# Patient Record
Sex: MALE | Race: WHITE | Employment: UNEMPLOYED | URBAN - METROPOLITAN AREA
[De-identification: names, ages, dates, MRNs, and addresses within clinical notes are randomized per-mention and may not be internally consistent; named-entity substitution may affect disease eponyms.]

---

## 2019-02-25 ENCOUNTER — APPOINTMENT (OUTPATIENT)
Dept: CT IMAGING | Age: 59
End: 2019-02-25
Attending: PHYSICIAN ASSISTANT
Payer: MEDICAID

## 2019-02-25 ENCOUNTER — HOSPITAL ENCOUNTER (EMERGENCY)
Age: 59
Discharge: HOME | End: 2019-02-25
Attending: EMERGENCY MEDICINE
Payer: MEDICAID

## 2019-02-25 ENCOUNTER — OFFICE VISIT (OUTPATIENT)
Dept: INTERNAL MEDICINE CLINIC | Age: 59
End: 2019-02-25

## 2019-02-25 VITALS
OXYGEN SATURATION: 99 % | SYSTOLIC BLOOD PRESSURE: 165 MMHG | BODY MASS INDEX: 30.35 KG/M2 | WEIGHT: 229 LBS | HEIGHT: 73 IN | RESPIRATION RATE: 18 BRPM | DIASTOLIC BLOOD PRESSURE: 97 MMHG | HEART RATE: 68 BPM | TEMPERATURE: 99.6 F

## 2019-02-25 VITALS
SYSTOLIC BLOOD PRESSURE: 224 MMHG | BODY MASS INDEX: 30.35 KG/M2 | RESPIRATION RATE: 16 BRPM | TEMPERATURE: 98 F | HEART RATE: 71 BPM | WEIGHT: 229 LBS | HEIGHT: 73 IN | OXYGEN SATURATION: 97 % | DIASTOLIC BLOOD PRESSURE: 116 MMHG

## 2019-02-25 DIAGNOSIS — I16.0 HYPERTENSIVE URGENCY: Primary | ICD-10-CM

## 2019-02-25 DIAGNOSIS — R03.0 ELEVATED BLOOD PRESSURE READING: Primary | ICD-10-CM

## 2019-02-25 LAB
ALBUMIN SERPL-MCNC: 3.9 G/DL (ref 3.4–5)
ALBUMIN/GLOB SERPL: 1 {RATIO} (ref 0.8–1.7)
ALP SERPL-CCNC: 66 U/L (ref 45–117)
ALT SERPL-CCNC: 33 U/L (ref 16–61)
ANION GAP SERPL CALC-SCNC: 8 MMOL/L (ref 3–18)
AST SERPL-CCNC: 19 U/L (ref 15–37)
BASOPHILS # BLD: 0 K/UL (ref 0–0.06)
BASOPHILS NFR BLD: 0 % (ref 0–3)
BILIRUB SERPL-MCNC: 0.2 MG/DL (ref 0.2–1)
BUN SERPL-MCNC: 15 MG/DL (ref 7–18)
BUN/CREAT SERPL: 13 (ref 12–20)
CALCIUM SERPL-MCNC: 8.9 MG/DL (ref 8.5–10.1)
CHLORIDE SERPL-SCNC: 105 MMOL/L (ref 100–108)
CK MB CFR SERPL CALC: NORMAL % (ref 0–4)
CK MB SERPL-MCNC: <1 NG/ML (ref 5–25)
CK SERPL-CCNC: 52 U/L (ref 39–308)
CO2 SERPL-SCNC: 27 MMOL/L (ref 21–32)
CREAT SERPL-MCNC: 1.14 MG/DL (ref 0.6–1.3)
DIFFERENTIAL METHOD BLD: NORMAL
EOSINOPHIL # BLD: 0 K/UL (ref 0–0.4)
EOSINOPHIL NFR BLD: 0 % (ref 0–5)
ERYTHROCYTE [DISTWIDTH] IN BLOOD BY AUTOMATED COUNT: 14 % (ref 11.6–14.5)
GLOBULIN SER CALC-MCNC: 4 G/DL (ref 2–4)
GLUCOSE SERPL-MCNC: 92 MG/DL (ref 74–99)
HCT VFR BLD AUTO: 44.9 % (ref 36–48)
HGB BLD-MCNC: 14.5 G/DL (ref 13–16)
LYMPHOCYTES # BLD: 2.2 K/UL (ref 0.8–3.5)
LYMPHOCYTES NFR BLD: 28 % (ref 20–51)
MCH RBC QN AUTO: 27.4 PG (ref 24–34)
MCHC RBC AUTO-ENTMCNC: 32.3 G/DL (ref 31–37)
MCV RBC AUTO: 84.9 FL (ref 74–97)
MONOCYTES # BLD: 0.5 K/UL (ref 0–1)
MONOCYTES NFR BLD: 7 % (ref 2–9)
NEUTS SEG # BLD: 5 K/UL (ref 1.8–8)
NEUTS SEG NFR BLD: 65 % (ref 42–75)
PLATELET # BLD AUTO: 244 K/UL (ref 135–420)
PLATELET COMMENTS,PCOM: NORMAL
PMV BLD AUTO: 10.1 FL (ref 9.2–11.8)
POTASSIUM SERPL-SCNC: 4 MMOL/L (ref 3.5–5.5)
PROT SERPL-MCNC: 7.9 G/DL (ref 6.4–8.2)
RBC # BLD AUTO: 5.29 M/UL (ref 4.7–5.5)
RBC MORPH BLD: NORMAL
SODIUM SERPL-SCNC: 140 MMOL/L (ref 136–145)
TROPONIN I SERPL-MCNC: <0.02 NG/ML (ref 0–0.04)
WBC # BLD AUTO: 7.7 K/UL (ref 4.6–13.2)

## 2019-02-25 PROCEDURE — 70450 CT HEAD/BRAIN W/O DYE: CPT

## 2019-02-25 PROCEDURE — 82550 ASSAY OF CK (CPK): CPT

## 2019-02-25 PROCEDURE — 85025 COMPLETE CBC W/AUTO DIFF WBC: CPT

## 2019-02-25 PROCEDURE — 99283 EMERGENCY DEPT VISIT LOW MDM: CPT

## 2019-02-25 PROCEDURE — 80053 COMPREHEN METABOLIC PANEL: CPT

## 2019-02-25 PROCEDURE — 93005 ELECTROCARDIOGRAM TRACING: CPT

## 2019-02-25 NOTE — ED TRIAGE NOTES
Patient c/o intermittent frontal headaches and dizziness x 3 weeks. Voices concerns for elevated blood pressure following PCP visit.

## 2019-02-25 NOTE — ED NOTES
Pt resting comfortably on stretcher, in nad, denies pain at this time, updated on plan of care waiting for CT scan, oriented to call bell, visitor at bedside.

## 2019-02-25 NOTE — PROGRESS NOTES
ROOM # 11 Rufina Yen. presents today for Chief Complaint Patient presents with [facility] Care  Fatigue Rufina Yen. preferred language for health care discussion is english/other. Depression Screening: 
3 most recent PHQ Screens 2/25/2019 Little interest or pleasure in doing things Not at all Feeling down, depressed, irritable, or hopeless Not at all Total Score PHQ 2 0 Learning Assessment: 
No flowsheet data found. Abuse Screening: No flowsheet data found. Fall Risk No flowsheet data found. Visit Vitals BP (!) 224/126 (BP 1 Location: Left arm, BP Patient Position: Sitting) Pulse 71 Temp 98 °F (36.7 °C) (Oral) Resp 16 Ht 6' 1\" (1.854 m) Wt 229 lb (103.9 kg) SpO2 97% BMI 30.21 kg/m² Health Maintenance reviewed and discussed per provider. Yes Rufina Yen. is due for Health Maintenance Due Topic Date Due  
 Hepatitis C Screening  1960  
 DTaP/Tdap/Td series (1 - Tdap) 01/27/1981  Shingrix Vaccine Age 50> (1 of 2) 01/27/2010  
 FOBT Q 1 YEAR AGE 50-75  01/27/2010  Influenza Age 5 to Adult  08/01/2018 Please order/place referral if appropriate. Advance Directive: 1. Do you have an advance directive in place? Patient Reply: No 
 
2. If not, would you like material regarding how to put one in place? Patient Reply: No 
 
Coordination of Care: 1. Have you been to the ER, urgent care clinic since your last visit? Hospitalized since your last visit?  No

## 2019-02-25 NOTE — DISCHARGE INSTRUCTIONS
Patient Education        Elevated Blood Pressure: Care Instructions  Your Care Instructions    Blood pressure is a measure of how hard the blood pushes against the walls of your arteries. It's normal for blood pressure to go up and down throughout the day. But if it stays up over time, you have high blood pressure. Two numbers tell you your blood pressure. The first number is the systolic pressure. It shows how hard the blood pushes when your heart is pumping. The second number is the diastolic pressure. It shows how hard the blood pushes between heartbeats, when your heart is relaxed and filling with blood. An ideal blood pressure in adults is less than 120/80 (say \"120 over 80\"). High blood pressure is 140/90 or higher. You have high blood pressure if your top number is 140 or higher or your bottom number is 90 or higher, or both. The main test for high blood pressure is simple, fast, and painless. To diagnose high blood pressure, your doctor will test your blood pressure at different times. After testing your blood pressure, your doctor may ask you to test it again when you are home. If you are diagnosed with high blood pressure, you can work with your doctor to make a long-term plan to manage it. Follow-up care is a key part of your treatment and safety. Be sure to make and go to all appointments, and call your doctor if you are having problems. It's also a good idea to know your test results and keep a list of the medicines you take. How can you care for yourself at home? · Do not smoke. Smoking increases your risk for heart attack and stroke. If you need help quitting, talk to your doctor about stop-smoking programs and medicines. These can increase your chances of quitting for good. · Stay at a healthy weight. · Try to limit how much sodium you eat to less than 2,300 milligrams (mg) a day. Your doctor may ask you to try to eat less than 1,500 mg a day. · Be physically active.  Get at least 30 minutes of exercise on most days of the week. Walking is a good choice. You also may want to do other activities, such as running, swimming, cycling, or playing tennis or team sports. · Avoid or limit alcohol. Talk to your doctor about whether you can drink any alcohol. · Eat plenty of fruits, vegetables, and low-fat dairy products. Eat less saturated and total fats. · Learn how to check your blood pressure at home. When should you call for help? Call your doctor now or seek immediate medical care if:  ? · Your blood pressure is much higher than normal (such as 180/110 or higher). ? · You think high blood pressure is causing symptoms such as:  ¨ Severe headache. ¨ Blurry vision. ? Watch closely for changes in your health, and be sure to contact your doctor if:  ? · You do not get better as expected. Where can you learn more? Go to http://gasper-regina.info/. Enter X058 in the search box to learn more about \"Elevated Blood Pressure: Care Instructions. \"  Current as of: September 21, 2016  Content Version: 11.4  © 5435-0274 [employer]. Care instructions adapted under license by [employer] (which disclaims liability or warranty for this information). If you have questions about a medical condition or this instruction, always ask your healthcare professional. [address] any warranty or liability for your use of this information.

## 2019-02-25 NOTE — ED PROVIDER NOTES
EMERGENCY DEPARTMENT HISTORY AND PHYSICAL EXAM 
 
Date: 2/25/2019 Patient Name: Andres Narayanan. History of Presenting Illness Chief Complaint Patient presents with  Hypertension  Dizziness History Provided By: Patient Chief Complaint: high bp Duration: unknown Timing: Location:  
Quality:  
Severity:moderate Modifying Factors:  
Associated Symptoms: dizziness, headaches Additional History (Context): Andres Flores is a 61 y.o. male with No significant past medical history  who presents with high BP noted during apt with PCP earlier today. States BP was high at new pcp today so was sent to ED for work up. States he has no sx at present, occasionally has some dizziness and a headache but not now. No CP, sob, abdominal pain, nv or any other sx. Denies smoking, etoh. +marijuana use. PCP: Leda Saha MD 
 
Current Outpatient Medications Medication Sig Dispense Refill  pseudoephedrine HCl (SUDAFED 12 HOUR PO) Take  by mouth. Past History Past Medical History: 
Past Medical History:  
Diagnosis Date  Hypertension Past Surgical History: No past surgical history on file. Family History: 
Family History Problem Relation Age of Onset  Hypertension Mother  Cancer Father Throat  Hypertension Father  Cancer Maternal Grandmother  Cancer Maternal Grandfather  Heart Disease Paternal Grandfather Social History: 
Social History Tobacco Use  Smoking status: Never Smoker  Smokeless tobacco: Current User Substance Use Topics  Alcohol use: No  
  Frequency: Never  Drug use: Yes Types: Marijuana Allergies: 
No Known Allergies Review of Systems Review of Systems Constitutional: Negative for chills and fever. HENT: Negative for nasal congestion, sore throat, rhinorrhea Eyes: Negative. Respiratory: neg cough and negative for shortness of breath. Cardiovascular: Negative for chest pain and palpitations. Gastrointestinal: Negative for abdominal pain, constipation, diarrhea, nausea and vomiting. Genitourinary: Negative. Negative for difficulty urinating and flank pain. Musculoskeletal: Negative for back pain. Negative for gait problem and neck pain. Skin: Negative. Allergic/Immunologic: Negative. Neurological: pos for headaches, pos for dizziness, weakness, numbness. Psychiatric/Behavioral: Negative. All other systems reviewed and are negative. All Other Systems Negative Physical Exam  
 
Vitals:  
 02/25/19 1459 02/25/19 1515 02/25/19 1600 02/25/19 1645 BP: (!) 197/114 (!) 170/105 (!) 160/91 (!) 157/93 Pulse: 68 Resp: 18 Temp: 99.6 °F (37.6 °C) SpO2: 97% 97% 97% 98% Weight: 103.9 kg (229 lb) Height: 6' 1\" (1.854 m) Physical Exam  
Constitutional: He is oriented to person, place, and time. He appears well-developed and well-nourished. No distress. NAD, well hydrated, non toxic HENT:  
Head: Normocephalic and atraumatic. Nose: Nose normal.  
Mouth/Throat: Oropharynx is clear and moist. No oropharyngeal exudate. Eyes: Conjunctivae and EOM are normal. Pupils are equal, round, and reactive to light. Neck: Normal range of motion. Neck supple. Cardiovascular: Normal rate, regular rhythm and normal heart sounds. No murmur heard. Pulmonary/Chest: Effort normal and breath sounds normal. No respiratory distress. He has no wheezes. He has no rales. Abdominal: Soft. He exhibits no distension. There is no tenderness. There is no guarding. Musculoskeletal: Normal range of motion. Lymphadenopathy:  
  He has no cervical adenopathy. Neurological: He is alert and oriented to person, place, and time. No cranial nerve deficit. Coordination normal.  
CN2-12 intact. no nystagmus, neg pronator drift, neg romberg, neg LE drift. Normal gait. Skin: Skin is warm. No rash noted. He is not diaphoretic. Psychiatric: He has a normal mood and affect. His behavior is normal.  
Nursing note and vitals reviewed. Diagnostic Study Results Labs - Recent Results (from the past 12 hour(s)) EKG, 12 LEAD, INITIAL Collection Time: 02/25/19  3:05 PM  
Result Value Ref Range Ventricular Rate 66 BPM  
 Atrial Rate 66 BPM  
 P-R Interval 162 ms QRS Duration 94 ms Q-T Interval 378 ms QTC Calculation (Bezet) 396 ms Calculated P Axis 52 degrees Calculated R Axis 24 degrees Calculated T Axis 66 degrees Diagnosis Normal sinus rhythm Normal ECG No previous ECGs available CBC WITH AUTOMATED DIFF Collection Time: 02/25/19  3:35 PM  
Result Value Ref Range WBC 7.7 4.6 - 13.2 K/uL  
 RBC 5.29 4.70 - 5.50 M/uL  
 HGB 14.5 13.0 - 16.0 g/dL HCT 44.9 36.0 - 48.0 % MCV 84.9 74.0 - 97.0 FL  
 MCH 27.4 24.0 - 34.0 PG  
 MCHC 32.3 31.0 - 37.0 g/dL  
 RDW 14.0 11.6 - 14.5 % PLATELET 447 140 - 091 K/uL MPV 10.1 9.2 - 11.8 FL  
 NEUTROPHILS 65 42 - 75 % LYMPHOCYTES 28 20 - 51 % MONOCYTES 7 2 - 9 % EOSINOPHILS 0 0 - 5 % BASOPHILS 0 0 - 3 %  
 ABS. NEUTROPHILS 5.0 1.8 - 8.0 K/UL  
 ABS. LYMPHOCYTES 2.2 0.8 - 3.5 K/UL  
 ABS. MONOCYTES 0.5 0 - 1.0 K/UL  
 ABS. EOSINOPHILS 0.0 0.0 - 0.4 K/UL  
 ABS. BASOPHILS 0.0 0.0 - 0.06 K/UL  
 DF MANUAL PLATELET COMMENTS ADEQUATE PLATELETS    
 RBC COMMENTS NORMOCYTIC, NORMOCHROMIC METABOLIC PANEL, COMPREHENSIVE Collection Time: 02/25/19  3:35 PM  
Result Value Ref Range Sodium 140 136 - 145 mmol/L Potassium 4.0 3.5 - 5.5 mmol/L Chloride 105 100 - 108 mmol/L  
 CO2 27 21 - 32 mmol/L Anion gap 8 3.0 - 18 mmol/L Glucose 92 74 - 99 mg/dL BUN 15 7.0 - 18 MG/DL Creatinine 1.14 0.6 - 1.3 MG/DL  
 BUN/Creatinine ratio 13 12 - 20 GFR est AA >60 >60 ml/min/1.73m2 GFR est non-AA >60 >60 ml/min/1.73m2  Calcium 8.9 8.5 - 10.1 MG/DL  
 Bilirubin, total 0.2 0.2 - 1.0 MG/DL  
 ALT (SGPT) 33 16 - 61 U/L  
 AST (SGOT) 19 15 - 37 U/L Alk. phosphatase 66 45 - 117 U/L Protein, total 7.9 6.4 - 8.2 g/dL Albumin 3.9 3.4 - 5.0 g/dL Globulin 4.0 2.0 - 4.0 g/dL A-G Ratio 1.0 0.8 - 1.7 CARDIAC PANEL,(CK, CKMB & TROPONIN) Collection Time: 02/25/19  3:35 PM  
Result Value Ref Range CK 52 39 - 308 U/L  
 CK - MB <1.0 <3.6 ng/ml CK-MB Index  0.0 - 4.0 % CALCULATION NOT PERFORMED WHEN RESULT IS BELOW LINEAR LIMIT Troponin-I, QT <0.02 0.0 - 0.045 NG/ML Radiologic Studies -  
CT HEAD WO CONT    (Results Pending) CT Results  (Last 48 hours) None CXR Results  (Last 48 hours) None Medical Decision Making I am the first provider for this patient. I reviewed the vital signs, available nursing notes, past medical history, past surgical history, family history and social history. Vital Signs-Reviewed the patient's vital signs. Records Reviewed: Nursing Notes Procedures: 
Procedures Provider Notes (Medical Decision Making):  
[id number] M sent to ED by PCP for elevated BP. No sx at present, h/o intermittent headaches and some dizziness- not at present. Labs, cxr, ekg negative. No signs of end organ disease. BP improved. Will d/c home with f/u on Friday at scheduled apt. MED RECONCILIATION: 
No current facility-administered medications for this encounter. Current Outpatient Medications Medication Sig  pseudoephedrine HCl (SUDAFED 12 HOUR PO) Take  by mouth. Disposition: 
home DISCHARGE NOTE:  
 
Pt has been reexamined. Patient has no new complaints, changes, or physical findings. Care plan outlined and precautions discussed. Discussed proper way to take medications. Discussed treatment plan, return precautions, symptomatic relief, and expected time to improvement. All questions answered.  Patient is stable for discharge and outpatient management. Patient is ready to go home. Follow-up Information None Current Discharge Medication List  
  
 
 
 
 
 
Diagnosis Clinical Impression: No diagnosis found.

## 2019-02-25 NOTE — PROGRESS NOTES
HPI  
 
Nikita Cornejo. is a 61 y.o. male with no significant PMH, here to establish care. The patient does report h/o chronic headaches, mainly in AM, associated with fatigue, mental fog sometimes. Presently having the headache, he describes as mild to moderate, frontal, non radiated, pressure like, associated with mild malaise. States the headaches have been worsening for the past few weeks but have been present for many months now. The patient's blood pressure is severely elevated 224/116, HR 71, O2sat 97%. Denies any known history of HTN, but has not been evaluated by a medical provider in almost 12 years. Says he used to be incarcerated. The patient denies any known drug allergies, does not take any prescription medications. Denies any h/o Tobacco use. Admits to drinking alcohol rarely (last drink around 3 weeks ago he says), and h/o marijuana use. The patient works in construction, has a girlfriend with him today in the waiting room, he says. He denies any chest pain, palpitations, dizziness, AMS, shortness of breath, syncope, nausea, vomiting, diarrhea, constipation, abdominal pain, GERD, rashes, dysuria, hematuria, frequency, incontinence, leg swelling. Does report chronic left hip pain, associated at times with pain shooting down his left leg, and bilateral feet numbness and tingling intermittently. ROS As above included in HPI. Otherwise 11 point review of systems negative including constitutional, skin, HENT, eyes, respiratory, cardiovascular, gastrointestinal, genitourinary, musculoskeletal, endocrine, hematologic, allergy, and neurologic. Past Medical History Past Medical History:  
Diagnosis Date  Hypertension History reviewed. No pertinent surgical history. Family History Family History Problem Relation Age of Onset  Hypertension Mother  Cancer Father Throat  Hypertension Father  Cancer Maternal Grandmother  Cancer Maternal Grandfather  Heart Disease Paternal Grandfather Social History He  reports that  has never smoked. He uses smokeless tobacco.  
Social History Substance and Sexual Activity Alcohol Use No  
 Frequency: Never Immunization History There is no immunization history on file for this patient. Allergies No Known Allergies Medications No current outpatient medications on file. No current facility-administered medications for this visit. Visit Vitals BP (!) 224/116 (BP 1 Location: Left arm, BP Patient Position: Sitting) Pulse 71 Temp 98 °F (36.7 °C) (Oral) Resp 16 Ht 6' 1\" (1.854 m) Wt 229 lb (103.9 kg) SpO2 97% BMI 30.21 kg/m² Body mass index is 30.21 kg/m². Physical Exam  
Constitutional: He is oriented to person, place, and time and well-developed, well-nourished, and in no distress. No distress. HENT:  
Head: Normocephalic and atraumatic. Right Ear: External ear normal.  
Nose: Nose normal.  
Mouth/Throat: Oropharynx is clear and moist.  
Eyes: Conjunctivae are normal. Pupils are equal, round, and reactive to light. Neck: Neck supple. No JVD present. Cardiovascular: Normal rate and regular rhythm. Pulmonary/Chest: Effort normal and breath sounds normal.  
Abdominal: Soft. Musculoskeletal: He exhibits no edema. Neurological: He is alert and oriented to person, place, and time. Skin: Skin is warm. He is not diaphoretic. Psychiatric: Affect normal.  
Nursing note and vitals reviewed. REVIEW OF DATA Labs No results found for any previous visit. CT Results (most recent): No results found for this or any previous visit. XR Results (most recent): No results found for this or any previous visit. CT All Micro Results None DIAGNOSIS AND PLAN There is no problem list on file for this patient. 1. Hypertensive urgency BP in clinic today of 224/116, associated with headache, fatigue. Strong FH of CAD. Referred to the ER for management. Close follow up within 3 days of discharge for long term management and continuity of care. Advised patient to call in case of any issues or concerns. Follow-up Disposition: Not on File Niharika Pereira MD

## 2019-02-26 LAB
ATRIAL RATE: 66 BPM
CALCULATED P AXIS, ECG09: 52 DEGREES
CALCULATED R AXIS, ECG10: 24 DEGREES
CALCULATED T AXIS, ECG11: 66 DEGREES
DIAGNOSIS, 93000: NORMAL
P-R INTERVAL, ECG05: 162 MS
Q-T INTERVAL, ECG07: 378 MS
QRS DURATION, ECG06: 94 MS
QTC CALCULATION (BEZET), ECG08: 396 MS
VENTRICULAR RATE, ECG03: 66 BPM

## 2019-03-01 ENCOUNTER — OFFICE VISIT (OUTPATIENT)
Dept: INTERNAL MEDICINE CLINIC | Age: 59
End: 2019-03-01

## 2019-03-01 VITALS
OXYGEN SATURATION: 99 % | DIASTOLIC BLOOD PRESSURE: 90 MMHG | HEIGHT: 73 IN | RESPIRATION RATE: 17 BRPM | SYSTOLIC BLOOD PRESSURE: 150 MMHG | BODY MASS INDEX: 30.35 KG/M2 | HEART RATE: 89 BPM | TEMPERATURE: 98.5 F | WEIGHT: 229 LBS

## 2019-03-01 DIAGNOSIS — I10 ESSENTIAL HYPERTENSION: Primary | ICD-10-CM

## 2019-03-01 DIAGNOSIS — M54.42 ACUTE MIDLINE LOW BACK PAIN WITH LEFT-SIDED SCIATICA: ICD-10-CM

## 2019-03-01 RX ORDER — GABAPENTIN 100 MG/1
100 CAPSULE ORAL
Qty: 30 CAP | Refills: 1 | Status: SHIPPED | OUTPATIENT
Start: 2019-03-01 | End: 2019-04-02 | Stop reason: ALTCHOICE

## 2019-03-01 RX ORDER — LOSARTAN POTASSIUM 50 MG/1
50 TABLET ORAL DAILY
Qty: 30 TAB | Refills: 6 | Status: SHIPPED | OUTPATIENT
Start: 2019-03-01 | End: 2019-04-12 | Stop reason: ALTCHOICE

## 2019-03-01 NOTE — PROGRESS NOTES
1. Have you been to the ER, urgent care clinic or hospitalized since your last visit? YES.     2. Have you seen or consulted any other health care providers outside of the [address] [place] [address] since your last visit (Include any pap smears or colon screening)? NO      Do you have an Advanced Directive? NO    Would you like information on Advanced Directives?  NO

## 2019-03-01 NOTE — PROGRESS NOTES
EVIN Rodriguez is a 61 y.o. male with HTN, sent to the ED 3 days ago for hypertensive emergency, work up reviewed, neg Tn, CT head unremarkable, CBC and CMP within normal range. Patient feels better today, but persists with occasional headaches, dizziness, and reports as well chronic low back and left hip pain, worse with activity and at night, with shooting sensation from left hip down his left leg. No motor/sensory changes noted other than radiated hip/leg pain. Denies any recent CP, SOB, leg swelling, palpitations. ROS  As above included in HPI. Otherwise 11 point review of systems negative including constitutional, skin, HENT, eyes, respiratory, cardiovascular, gastrointestinal, genitourinary, musculoskeletal, endocrine, hematologic, allergy, and neurologic. Past Medical History  Past Medical History:   Diagnosis Date    Hypertension      No past surgical history on file. Family History  Family History   Problem Relation Age of Onset    Hypertension Mother     Cancer Father         Throat    Hypertension Father     Cancer Maternal Grandmother     Cancer Maternal Grandfather     Heart Disease Paternal Grandfather        Social History  He  reports that  has never smoked. He uses smokeless tobacco.   Social History     Substance and Sexual Activity   Alcohol Use No    Frequency: Never       Immunization History    There is no immunization history on file for this patient. Allergies  No Known Allergies    Medications  Current Outpatient Medications   Medication Sig    losartan (COZAAR) 50 mg tablet Take 1 Tab by mouth daily.  gabapentin (NEURONTIN) 100 mg capsule Take 1 Cap by mouth nightly as needed for Pain. No current facility-administered medications for this visit.           Visit Vitals  /90 (BP 1 Location: Right arm, BP Patient Position: Sitting)   Pulse 89   Temp 98.5 °F (36.9 °C) (Oral)   Resp 17   Ht 6' 1\" (1.854 m)   Wt 229 lb (103.9 kg)   SpO2 99%   BMI 30.21 kg/m²     Body mass index is 30.21 kg/m². Physical Exam   Constitutional: He is well-developed, well-nourished, and in no distress. HENT:   Head: Normocephalic and atraumatic. Eyes: Pupils are equal, round, and reactive to light. Neck: Neck supple. Cardiovascular: Normal rate and regular rhythm. Pulmonary/Chest: Effort normal and breath sounds normal.   Abdominal: Soft. Musculoskeletal: He exhibits no edema. Nursing note and vitals reviewed. REVIEW OF DATA    Labs  Admission on 02/25/2019, Discharged on 02/25/2019   Component Date Value Ref Range Status    Ventricular Rate 02/25/2019 66  BPM Final    Atrial Rate 02/25/2019 66  BPM Final    P-R Interval 02/25/2019 162  ms Final    QRS Duration 02/25/2019 94  ms Final    Q-T Interval 02/25/2019 378  ms Final    QTC Calculation (Bezet) 02/25/2019 396  ms Final    Calculated P Axis 02/25/2019 52  degrees Final    Calculated R Axis 02/25/2019 24  degrees Final    Calculated T Axis 02/25/2019 66  degrees Final    Diagnosis 02/25/2019    Final                    Value:Normal sinus rhythm  Normal ECG  No previous ECGs available  Confirmed by Nasrin Babcock MD, Venkat ([id number]) on 2/26/2019 10:34:44 AM      WBC 02/25/2019 7.7  4.6 - 13.2 K/uL Final    RBC 02/25/2019 5.29  4.70 - 5.50 M/uL Final    HGB 02/25/2019 14.5  13.0 - 16.0 g/dL Final    HCT 02/25/2019 44.9  36.0 - 48.0 % Final    MCV 02/25/2019 84.9  74.0 - 97.0 FL Final    MCH 02/25/2019 27.4  24.0 - 34.0 PG Final    MCHC 02/25/2019 32.3  31.0 - 37.0 g/dL Final    RDW 02/25/2019 14.0  11.6 - 14.5 % Final    PLATELET 11/00/0030 953  135 - 420 K/uL Final    MPV 02/25/2019 10.1  9.2 - 11.8 FL Final    NEUTROPHILS 02/25/2019 65  42 - 75 % Final    LYMPHOCYTES 02/25/2019 28  20 - 51 % Final    MONOCYTES 02/25/2019 7  2 - 9 % Final    EOSINOPHILS 02/25/2019 0  0 - 5 % Final    BASOPHILS 02/25/2019 0  0 - 3 % Final    ABS. NEUTROPHILS 02/25/2019 5.0  1.8 - 8.0 K/UL Final    ABS. LYMPHOCYTES 02/25/2019 2.2  0.8 - 3.5 K/UL Final    ABS. MONOCYTES 02/25/2019 0.5  0 - 1.0 K/UL Final    ABS. EOSINOPHILS 02/25/2019 0.0  0.0 - 0.4 K/UL Final    ABS. BASOPHILS 02/25/2019 0.0  0.0 - 0.06 K/UL Final    DF 02/25/2019 MANUAL    Final    PLATELET COMMENTS 64/39/5592 ADEQUATE PLATELETS    Final    RBC COMMENTS 02/25/2019 NORMOCYTIC, NORMOCHROMIC    Final    Sodium 02/25/2019 140  136 - 145 mmol/L Final    Potassium 02/25/2019 4.0  3.5 - 5.5 mmol/L Final    Chloride 02/25/2019 105  100 - 108 mmol/L Final    CO2 02/25/2019 27  21 - 32 mmol/L Final    Anion gap 02/25/2019 8  3.0 - 18 mmol/L Final    Glucose 02/25/2019 92  74 - 99 mg/dL Final    BUN 02/25/2019 15  7.0 - 18 MG/DL Final    Creatinine 02/25/2019 1.14  0.6 - 1.3 MG/DL Final    BUN/Creatinine ratio 02/25/2019 13  12 - 20   Final    GFR est AA 02/25/2019 >60  >60 ml/min/1.73m2 Final    GFR est non-AA 02/25/2019 >60  >60 ml/min/1.73m2 Final    Calcium 02/25/2019 8.9  8.5 - 10.1 MG/DL Final    Bilirubin, total 02/25/2019 0.2  0.2 - 1.0 MG/DL Final    ALT (SGPT) 02/25/2019 33  16 - 61 U/L Final    AST (SGOT) 02/25/2019 19  15 - 37 U/L Final    Alk. phosphatase 02/25/2019 66  45 - 117 U/L Final    Protein, total 02/25/2019 7.9  6.4 - 8.2 g/dL Final    Albumin 02/25/2019 3.9  3.4 - 5.0 g/dL Final    Globulin 02/25/2019 4.0  2.0 - 4.0 g/dL Final    A-G Ratio 02/25/2019 1.0  0.8 - 1.7   Final    CK 02/25/2019 52  39 - 308 U/L Final    CK - MB 02/25/2019 <1.0  <3.6 ng/ml Final    CK-MB Index 02/25/2019 CALCULATION NOT PERFORMED WHEN RESULT IS BELOW LINEAR LIMIT  0.0 - 4.0 % Final    Troponin-I, QT 02/25/2019 <0.02  0.0 - 0.045 NG/ML Final         CT Results (most recent):  Results from [facility] encounter on 02/25/19   CT HEAD WO CONT    Narrative EXAM: CT HEAD WO CONT    CLINICAL INDICATION/HISTORY : headache.   Headache and hypertension  COMPARISON: None    TECHNIQUE: Helical axial scan was obtained from the skull base to the vertex  without IV contrast administration. All CT scans at this facility are performed using dose optimization of technique  as appropriate to a performed exam, to include automated exposure control,  adjustment of the mA and/or kV according to patient size (including appropriate  matching for site specific examinations), or use of iterative reconstruction  technique. FINDINGS:  The ventricles and sulci are normal in size, shape and position for patient's  age. There is no evidence of abnormal attenuation within the brain. Visualized portion of orbits and sinuses appear unremarkable. No hemorrhage identified. No mass lesion identified. No acute infarction identified. Typical posterior fossa artifacts      Impression IMPRESSION:    1. No evidence of acute intracranial process  2. Unremarkable exam          XR Results (most recent):  No results found for this or any previous visit. CT   All Micro Results     None              DIAGNOSIS AND PLAN  There is no problem list on file for this patient. 1. Essential hypertension  Start trial of losartan 50 mg daily, follow up in 1 month, told to call back in case of any adverse events or concerns. - losartan (COZAAR) 50 mg tablet; Take 1 Tab by mouth daily. Dispense: 30 Tab; Refill: 6    2. Acute midline low back pain with left-sided sciatica  Imaging work up as ordered  Trial of low dose gabapentin at bedtime as needed for shooting-like pain  - XR SPINE LUMB COMP W BEND; Future  - XR HIP LT W OR WO PELV 2-3 VWS; Future           Follow-up Disposition: Not on File     Niharika Diallo MD

## 2019-03-21 ENCOUNTER — HOSPITAL ENCOUNTER (OUTPATIENT)
Dept: GENERAL RADIOLOGY | Age: 59
Discharge: HOME | End: 2019-03-21
Payer: MEDICAID

## 2019-03-21 DIAGNOSIS — M54.42 ACUTE MIDLINE LOW BACK PAIN WITH LEFT-SIDED SCIATICA: ICD-10-CM

## 2019-03-21 PROCEDURE — 72114 X-RAY EXAM L-S SPINE BENDING: CPT

## 2019-03-21 PROCEDURE — 73502 X-RAY EXAM HIP UNI 2-3 VIEWS: CPT

## 2019-04-02 ENCOUNTER — OFFICE VISIT (OUTPATIENT)
Dept: INTERNAL MEDICINE CLINIC | Age: 59
End: 2019-04-02

## 2019-04-02 VITALS
WEIGHT: 232.2 LBS | SYSTOLIC BLOOD PRESSURE: 136 MMHG | BODY MASS INDEX: 30.77 KG/M2 | HEIGHT: 73 IN | HEART RATE: 80 BPM | DIASTOLIC BLOOD PRESSURE: 88 MMHG | TEMPERATURE: 98.1 F | RESPIRATION RATE: 17 BRPM | OXYGEN SATURATION: 98 %

## 2019-04-02 DIAGNOSIS — M43.10 RETROLISTHESIS OF VERTEBRAE: ICD-10-CM

## 2019-04-02 DIAGNOSIS — I10 ESSENTIAL HYPERTENSION: Primary | ICD-10-CM

## 2019-04-02 DIAGNOSIS — M54.42 ACUTE MIDLINE LOW BACK PAIN WITH LEFT-SIDED SCIATICA: ICD-10-CM

## 2019-04-02 RX ORDER — DICLOFENAC SODIUM 75 MG/1
75 TABLET, DELAYED RELEASE ORAL
Qty: 60 TAB | Refills: 2 | Status: SHIPPED | OUTPATIENT
Start: 2019-04-02 | End: 2019-05-06

## 2019-04-02 RX ORDER — DICLOFENAC SODIUM 75 MG/1
75 TABLET, DELAYED RELEASE ORAL
Qty: 60 TAB | Refills: 2 | Status: SHIPPED | OUTPATIENT
Start: 2019-04-02 | End: 2019-04-02 | Stop reason: SDUPTHER

## 2019-04-12 ENCOUNTER — OFFICE VISIT (OUTPATIENT)
Dept: INTERNAL MEDICINE CLINIC | Age: 59
End: 2019-04-12

## 2019-04-12 VITALS
WEIGHT: 224 LBS | HEIGHT: 73 IN | RESPIRATION RATE: 16 BRPM | HEART RATE: 64 BPM | OXYGEN SATURATION: 97 % | TEMPERATURE: 98.9 F | DIASTOLIC BLOOD PRESSURE: 90 MMHG | SYSTOLIC BLOOD PRESSURE: 152 MMHG | BODY MASS INDEX: 29.69 KG/M2

## 2019-04-12 DIAGNOSIS — M54.5 CHRONIC MIDLINE LOW BACK PAIN, WITH SCIATICA PRESENCE UNSPECIFIED: ICD-10-CM

## 2019-04-12 DIAGNOSIS — I10 ESSENTIAL HYPERTENSION: Primary | ICD-10-CM

## 2019-04-12 DIAGNOSIS — M43.10 RETROLISTHESIS OF VERTEBRAE: ICD-10-CM

## 2019-04-12 DIAGNOSIS — G89.29 CHRONIC MIDLINE LOW BACK PAIN, WITH SCIATICA PRESENCE UNSPECIFIED: ICD-10-CM

## 2019-04-12 RX ORDER — LOSARTAN POTASSIUM AND HYDROCHLOROTHIAZIDE 12.5; 5 MG/1; MG/1
1 TABLET ORAL DAILY
Qty: 90 TAB | Refills: 3 | Status: SHIPPED | OUTPATIENT
Start: 2019-04-12 | End: 2019-08-12 | Stop reason: ALTCHOICE

## 2019-05-06 ENCOUNTER — OFFICE VISIT (OUTPATIENT)
Dept: ORTHOPEDIC SURGERY | Age: 59
End: 2019-05-06

## 2019-05-06 VITALS
SYSTOLIC BLOOD PRESSURE: 152 MMHG | OXYGEN SATURATION: 96 % | RESPIRATION RATE: 15 BRPM | DIASTOLIC BLOOD PRESSURE: 88 MMHG | BODY MASS INDEX: 30.06 KG/M2 | HEIGHT: 73 IN | WEIGHT: 226.8 LBS | TEMPERATURE: 97.8 F | HEART RATE: 61 BPM

## 2019-05-06 DIAGNOSIS — G95.19 NEUROGENIC CLAUDICATION (HCC): ICD-10-CM

## 2019-05-06 DIAGNOSIS — M54.50 LUMBAR SPINE PAIN: ICD-10-CM

## 2019-05-06 DIAGNOSIS — M43.16 SPONDYLOLISTHESIS OF LUMBAR REGION: Primary | ICD-10-CM

## 2019-05-06 RX ORDER — CELECOXIB 200 MG/1
200 CAPSULE ORAL DAILY
Qty: 30 CAP | Refills: 1 | Status: SHIPPED | OUTPATIENT
Start: 2019-05-06 | End: 2019-08-04

## 2019-05-06 NOTE — PROGRESS NOTES
[address]  [address], [address]  [place], [address] [place] [address] Street  Phone: [phone]  Fax: [phone]        Darren Norris  : 1960  PCP: Xavi Armstrong MD      NEW PATIENT      ASSESSMENT AND PLAN     Diagnoses and all orders for this visit:    1. Spondylolisthesis of lumbar region  Comments:  multilevel  Orders:  -     MRI LUMB SPINE WO CONT; Future  -     REFERRAL TO PHYSICAL THERAPY  -     celecoxib (CELEBREX) 200 mg capsule; Take 1 Cap by mouth daily for 90 days. With food or prn for pain. 2. Neurogenic claudication  -     MRI LUMB SPINE WO CONT; Future  -     REFERRAL TO PHYSICAL THERAPY  -     celecoxib (CELEBREX) 200 mg capsule; Take 1 Cap by mouth daily for 90 days. With food or prn for pain. 3. Lumbar spine pain       1. Advised to stay active as tolerated. 2. MRI lumbar spine - Progressive low back pain, pain, numbness, tingling BLE. Failed NSAIDs, Gabapentin  3. Referral to PT  4. Trial of Celebrex  5. Avoid repetitive bending, lifting, and twisting. Avoid lifting 25lbs +. 6. Given information on low back exercises and spondylolisthesis exercises    F/U after MRI      CHIEF COMPLAINT  Octavio Puga. is seen today in consultation at the request of Dr. Alex Davis for complaints of low back and BLE pain. HISTORY OF PRESENT ILLNESS  Octavio Puga. is a 61 y.o. male. Today pt c/o low back and BLE pain of 20-25 year duration. Pt has had trauma that caused pain. He reports an injury while lifting a grill 30 years ago, he heard a pop in his back. He c/o low back pain at that time. Waxing and waning course, now daily pain. He reports pain with prolonged stationary standing. He denies benefit leaning over a shopping cart. He reports increased pain with increased activity. He states busier days lead to more pain at night. He admits to some aggravation with walking, but more with standing.  Pt states he previously had pain and spasms in his legs at night, but this has resolved. He reports imbalance. LBP>LE pain. xrays reviewed. Location of pain: low back  Does pain radiate into extremities: feet numbness, BLE pain  Does patient have weakness: BLE heaviness   Pt denies saddle paresthesias. Medications pt is on: Tylenol PRN some benefit. BC powders with temporary benefit. Diclofenac with little benefit. Gabapentin 100mg PRN with fatigue. Denies persistent fevers, chills, weight changes, neurogenic bowel or bladder symptoms. Pt denies any recent GI ulcers, bleeds or renal dysfucntion. Treatments patient has tried:  Physical therapy:No  Doing HEP: Unknown  Non-opioid medications: Yes Failed diclofenac, BC powders, Gabapentin  Spinal injections: No  Spinal surgery- No.      reviewed. PMHx of HTN. Pt last worked in 2011. ED 2/25/19 for elevated BP. Pain Assessment  5/6/2019   Location of Pain Back;Leg;Foot   Severity of Pain 8   Quality of Pain Burning;Aching   Quality of Pain Comment numbness, tingling   Duration of Pain Persistent   Frequency of Pain Constant   Limiting Behavior Yes   Relieving Factors Rest   Result of Injury No         XR Results (maximum last 3): Results from [facility] encounter on 03/21/19   XR HIP LT W OR WO PELV 2-3 VWS    Narrative History: Pain. No comparison    3 frontal views of the pelvis and left hip. Left hip view is frog leg lateral.    FINDINGS:    Bone mineral densities bilaterally decreased which limits evaluation. Pelvic  phleboliths and presumed bone islands noted. Pubic symphysis preserved. Sacroiliac joints symmetric. Slight left hip joint space narrowing with moderate  osteophyte formation. Right hip joint preserved. Bilateral iliopubic lines, ilioischial lines and Shenton's lines preserved. No fracture. Degenerative changes of the lumbar spine partially evaluated. Impression IMPRESSION:    No acute findings.     Osteoarthritis left hip.    Bilateral osseous sclerotic foci which may be bone islands. Most dominant in the  left iliac bone measuring 1.4 cm. Bone scan can be obtained for clarification. Alternatively short interval follow-up can be obtained. Please see report for additional findings and details. XR SPINE LUMB COMP W BEND    Narrative History: Pain. No comparison. 7 views lumbar spine. FINDINGS:    Abdominal atherosclerosis present. Bone mineral density is decreased which limits evaluation. There are scattered  bone islands. Exam is further limited by patient body habitus. Multilevel degenerative findings noted throughout. There is trace rotoscoliosis. There is mild retrolisthesis of L1 on L2 which appears stable on  flexion/extension. Mild retrolisthesis of L2 on L3 appears to resolve on flexion views and is  unchanged on extension views. Trace retrolisthesis of L3 on L4 appears to  resolve on extension views and unchanged on flexion views. Trace retrolisthesis  L4 and L5 is unchanged on flexion/extension. Impression IMPRESSION:    Degenerative changes as discussed. Spondylolisthesis as discussed. Atherosclerosis. PAST MEDICAL HISTORY   Past Medical History:   Diagnosis Date    Hypertension        History reviewed. No pertinent surgical history. MEDICATIONS      Current Outpatient Medications   Medication Sig Dispense Refill    celecoxib (CELEBREX) 200 mg capsule Take 1 Cap by mouth daily for 90 days. With food or prn for pain. 30 Cap 1    losartan-hydroCHLOROthiazide (HYZAAR) 50-12.5 mg per tablet Take 1 Tab by mouth daily. 90 Tab 3       ALLERGIES    Allergies   Allergen Reactions    Gabapentin Other (comments)     Fatigue.           SOCIAL HISTORY    Social History     Socioeconomic History    Marital status:      Spouse name: Not on file    Number of children: Not on file    Years of education: Not on file    Highest education level: Not on file   Occupational History    Not on file   Social Needs    Financial resource strain: Not on file    Food insecurity:     Worry: Not on file     Inability: Not on file    Transportation needs:     Medical: Not on file     Non-medical: Not on file   Tobacco Use    Smoking status: Never Smoker    Smokeless tobacco: Current User   Substance and Sexual Activity    Alcohol use: No     Frequency: Never    Drug use: Yes     Types: Marijuana    Sexual activity: Yes     Partners: Female   Lifestyle    Physical activity:     Days per week: Not on file     Minutes per session: Not on file    Stress: Not on file   Relationships    Social connections:     Talks on phone: Not on file     Gets together: Not on file     Attends [employer] service: Not on file     Active member of club or organization: Not on file     Attends meetings of clubs or organizations: Not on file     Relationship status: Not on file    Intimate partner violence:     Fear of current or ex partner: Not on file     Emotionally abused: Not on file     Physically abused: Not on file     Forced sexual activity: Not on file   Other Topics Concern    Not on file   Social History Narrative    Not on file       FAMILY HISTORY    Family History   Problem Relation Age of Onset    Hypertension Mother     Cancer Father         Throat    Hypertension Father     Cancer Maternal Grandmother     Cancer Maternal Grandfather     Heart Disease Paternal Grandfather          REVIEW OF SYSTEMS  Review of Systems   Constitutional: Negative for chills, fever and weight loss. Respiratory: Negative for shortness of breath. Cardiovascular: Negative for chest pain. Gastrointestinal: Negative for constipation. Negative for fecal incontinence   Genitourinary: Negative for dysuria. Negative for urinary incontinence   Musculoskeletal: Positive for back pain and joint pain. Per HPI   Skin: Negative for rash. Neurological: Positive for tingling.  Negative for dizziness, tremors, focal weakness and headaches. Endo/Heme/Allergies: Does not bruise/bleed easily. Psychiatric/Behavioral: The patient does not have insomnia. PHYSICAL EXAMINATION  Visit Vitals  /88   Pulse 61   Temp 97.8 °F (36.6 °C)   Resp 15   Ht 6' 1\" (1.854 m)   Wt 226 lb 12.8 oz (102.9 kg)   SpO2 96%   BMI 29.92 kg/m²          Accompanied by spouse. Constitutional:  Well developed, well nourished, in no acute distress. Psychiatric: Affect and mood are appropriate. Integumentary: No rashes or abrasions noted on exposed areas. Cardiovascular/Peripheral Vascular: Intact l pulses. No peripheral edema is noted BLE. Lymphatic:  No evidence of lymphedema. No cervical lymphadenopathy. SPINE/MUSCULOSKELETAL EXAM      Lumbar spine:  No rash, ecchymosis, or gross obliquity. No fasciculations. No focal atrophy is noted. Pain with lumbar forward flexion, L lateral bending, extension. Tenderness to palpation L L4-5, midline sacrum. Mild Tenderness to palpation of L trochanteric bursa. No tenderness to palpation at the sciatic notch. SI joints non-tender. MOTOR:       Hip Flex  Quads Hamstrings Ankle DF EHL Ankle PF   Right +4/5 +4/5 +4/5 +4/5 +4/5 +4/5   Left +4/5 +4/5 +4/5 +4/5 +4/5 +4/5       Straight Leg raise negative. Mild difficulty with tandem gait. Intact Heel rise. Back pain with Toe rise. Ambulation without assistive device. FWB. Written by Jennifer Fung, as dictated by Dennis Leiva MD.    I, Dr. Dennis Leiva MD, confirm that all documentation is accurate. Mr. Ulysses Patience may have a reminder for a \"due or due soon\" health maintenance. I have asked that he contact his primary care provider for follow-up on this health maintenance.

## 2019-05-06 NOTE — PATIENT INSTRUCTIONS
Low Back Pain: Exercises  Your Care Instructions  Here are some examples of typical rehabilitation exercises for your condition. Start each exercise slowly. Ease off the exercise if you start to have pain. Your doctor or physical therapist will tell you when you can start these exercises and which ones will work best for you. How to do the exercises  Press-up    1. Lie on your stomach, supporting your body with your forearms. 2. Press your elbows down into the floor to raise your upper back. As you do this, relax your stomach muscles and allow your back to arch without using your back muscles. As your press up, do not let your hips or pelvis come off the floor. 3. Hold for 15 to 30 seconds, then relax. 4. Repeat 2 to 4 times. Alternate arm and leg (bird dog) exercise    1. Start on the floor, on your hands and knees. 2. Tighten your belly muscles. 3. Raise one leg off the floor, and hold it straight out behind you. Be careful not to let your hip drop down, because that will twist your trunk. 4. Hold for about 6 seconds, then lower your leg and switch to the other leg. 5. Repeat 8 to 12 times on each leg. 6. Over time, work up to holding for 10 to 30 seconds each time. 7. If you feel stable and secure with your leg raised, try raising the opposite arm straight out in front of you at the same time. Knee-to-chest exercise    1. Lie on your back with your knees bent and your feet flat on the floor. 2. Bring one knee to your chest, keeping the other foot flat on the floor (or keeping the other leg straight, whichever feels better on your lower back). 3. Keep your lower back pressed to the floor. Hold for at least 15 to 30 seconds. 4. Relax, and lower the knee to the starting position. 5. Repeat with the other leg. Repeat 2 to 4 times with each leg. 6. To get more stretch, put your other leg flat on the floor while pulling your knee to your chest.    Curl-ups    1.  Lie on the floor on your back with your knees bent at a 90-degree angle. Your feet should be flat on the floor, about 12 inches from your buttocks. 2. Cross your arms over your chest. If this bothers your neck, try putting your hands behind your neck (not your head), with your elbows spread apart. 3. Slowly tighten your belly muscles and raise your shoulder blades off the floor. 4. Keep your head in line with your body, and do not press your chin to your chest.  5. Hold this position for 1 or 2 seconds, then slowly lower yourself back down to the floor. 6. Repeat 8 to 12 times. Pelvic tilt exercise    1. Lie on your back with your knees bent. 2. \"Brace\" your stomach. This means to tighten your muscles by pulling in and imagining your belly button moving toward your spine. You should feel like your back is pressing to the floor and your hips and pelvis are rocking back. 3. Hold for about 6 seconds while you breathe smoothly. 4. Repeat 8 to 12 times. Heel dig bridging    1. Lie on your back with both knees bent and your ankles bent so that only your heels are digging into the floor. Your knees should be bent about 90 degrees. 2. Then push your heels into the floor, squeeze your buttocks, and lift your hips off the floor until your shoulders, hips, and knees are all in a straight line. 3. Hold for about 6 seconds as you continue to breathe normally, and then slowly lower your hips back down to the floor and rest for up to 10 seconds. 4. Do 8 to 12 repetitions. Hamstring stretch in doorway    1. Lie on your back in a doorway, with one leg through the open door. 2. Slide your leg up the wall to straighten your knee. You should feel a gentle stretch down the back of your leg. 3. Hold the stretch for at least 15 to 30 seconds. Do not arch your back, point your toes, or bend either knee. Keep one heel touching the floor and the other heel touching the wall. 4. Repeat with your other leg. 5. Do 2 to 4 times for each leg.     Hip flexor stretch    1. Kneel on the floor with one knee bent and one leg behind you. Place your forward knee over your foot. Keep your other knee touching the floor. 2. Slowly push your hips forward until you feel a stretch in the upper thigh of your rear leg. 3. Hold the stretch for at least 15 to 30 seconds. Repeat with your other leg. 4. Do 2 to 4 times on each side. Wall sit    1. Stand with your back 10 to 12 inches away from a wall. 2. Lean into the wall until your back is flat against it. 3. Slowly slide down until your knees are slightly bent, pressing your lower back into the wall. 4. Hold for about 6 seconds, then slide back up the wall. 5. Repeat 8 to 12 times. Follow-up care is a key part of your treatment and safety. Be sure to make and go to all appointments, and call your doctor if you are having problems. It's also a good idea to know your test results and keep a list of the medicines you take. Where can you learn more? Go to http://gasper-regina.info/. Enter C543 in the search box to learn more about \"Low Back Pain: Exercises. \"  Current as of: September 20, 2018  Content Version: 11.9  © 7636-7521 [employer]. Care instructions adapted under license by [employer] (which disclaims liability or warranty for this information). If you have questions about a medical condition or this instruction, always ask your healthcare professional. [place] [address] any warranty or liability for your use of this information. Spondylolysis and Spondylolisthesis: Exercises  Your Care Instructions  Here are some examples of typical rehabilitation exercises for your condition. Start each exercise slowly. Ease off the exercise if you start to have pain. Your doctor or physical therapist will tell you when you can start these exercises and which ones will work best for you. How to do the exercises  Single knee-to-chest    1.  Lie on your back with your knees bent and your feet flat on the floor. You can put a small pillow under your head and neck if it is more comfortable. 2. Bring one knee to your chest, keeping the other foot flat on the floor. 3. Keep your lower back pressed to the floor. Hold for 15 to 30 seconds. 4. Relax, and lower the knee to the starting position. 5. Repeat with the other leg. Repeat 2 to 4 times with each leg. 6. To get more stretch, put your other leg flat on the floor while pulling your knee to your chest.    Double knee-to-chest    1. Lie on your back with your knees bent and your feet flat on the floor. You can put a small pillow under your head and neck if it is more comfortable. 2. Bring both knees to your chest.  3. Keep your lower back pressed to the floor. Hold for 15 to 30 seconds. 4. Relax, and lower your knees to the starting position. 5. Repeat 2 to 4 times. Alternate arm and leg (bird dog) exercise    1. Start on the floor, on your hands and knees. 2. Tighten your belly muscles by pulling your belly button in toward your spine. Be sure you continue to breathe normally and do not hold your breath. 3. Raise one arm off the floor, and hold it straight out in front of you. Be careful not to let your shoulder drop down, because that will twist your trunk. 4. Hold for about 6 seconds, then lower your arm and switch to your other arm. 5. Repeat 8 to 12 times on each arm. 6. When you can do this exercise with ease and no pain, repeat steps 1 through 5. But this time do it with one leg raised off the floor, holding your leg straight out behind you. Be careful not to let your hip drop down, because that will twist your trunk. 7. When holding your leg straight out becomes easier, try raising your opposite arm at the same time, and repeat steps 1 through 5. Bridging    1. Lie on your back with both knees bent. Your knees should be bent about 90 degrees.   2. Then push your feet into the floor, squeeze your buttocks, and lift your hips off the floor until your shoulders, hips, and knees are all in a straight line. 3. Hold for about 6 seconds as you continue to breathe normally, and then slowly lower your hips back down to the floor and rest for up to 10 seconds. 4. Repeat 8 to 12 times. Curl-ups    1. Lie on the floor on your back with your knees bent at a 90-degree angle. Your feet should be flat on the floor, about 12 inches from your buttocks. 2. Cross your arms over your chest. If this bothers your neck, try putting your hands behind your neck (not your head), with your elbows spread apart. 3. Slowly tighten your belly muscles and raise your shoulder blades off the floor. 4. Keep your head in line with your body, and do not press your chin to your chest.  5. Hold this position for 1 or 2 seconds, then slowly lower yourself back down to the floor. 6. Repeat 8 to 12 times. Plank    1. Lie on your stomach, resting your upper body on your forearms. 2. Tighten your belly muscles by pulling your belly button in toward your spine. 3. Keeping your knees on the floor, press down with your forearms to lift your upper body off the floor. 4. Hold for about 6 seconds, then lower your body to the floor. Rest for up to 10 seconds. 5. Repeat 8 to 12 times. 6. Over time, work up to holding for 15 to 30 seconds each time. 7. If this exercise is easy to do with your knees on the floor, try doing this exercise with your knees and legs straight, supported by your toes on the floor. Follow-up care is a key part of your treatment and safety. Be sure to make and go to all appointments, and call your doctor if you are having problems. It's also a good idea to know your test results and keep a list of the medicines you take. Where can you learn more? Go to http://gasper-regina.info/. Enter [phone] in the search box to learn more about \"Spondylolysis and Spondylolisthesis: Exercises. \"  Current as of: September 20, 2018  Content Version: 11.9  © 9595-1915 [employer]. Care instructions adapted under license by [employer] (which disclaims liability or warranty for this information). If you have questions about a medical condition or this instruction, always ask your healthcare professional. [address] any warranty or liability for your use of this information.

## 2019-05-15 NOTE — PROGRESS NOTES
HPI     Talia Peres. is a 61 y.o. male with HTN, and hypertensive emergency, chronic low back and left hip pain, worse with activity and at night, with shooting sensation from left hip down his left leg, found to have on recent imaging, spondylolisthesis, DJD, atherosclerosis on lumbar X-rays done 3/2/119, and OA of left hip and bilateral osseous sclerotic foci which may be bone islands. Most dominant in the left iliac bone measuring 1.4 cmon Left hip/pelvis x-rays. No motor/sensory changes noted other than radiated hip/leg pain. Reports gabapentin did not cause significant relief and did cause sedation, so patient has stopped taking it. Denies any recent CP, SOB, leg swelling, palpitations. ROS  As above included in HPI. Otherwise 11 point review of systems negative including constitutional, skin, HENT, eyes, respiratory, cardiovascular, gastrointestinal, genitourinary, musculoskeletal, endocrine, hematologic, allergy, and neurologic. Past Medical History  Past Medical History:   Diagnosis Date    Hypertension      No past surgical history on file. Family History  Family History   Problem Relation Age of Onset    Hypertension Mother     Cancer Father         Throat    Hypertension Father     Cancer Maternal Grandmother     Cancer Maternal Grandfather     Heart Disease Paternal Grandfather        Social History  He  reports that he has never smoked. He uses smokeless tobacco.   Social History     Substance and Sexual Activity   Alcohol Use No    Frequency: Never       Immunization History    There is no immunization history on file for this patient. Allergies  Allergies   Allergen Reactions    Gabapentin Other (comments)     Fatigue. Medications  Current Outpatient Medications   Medication Sig    celecoxib (CELEBREX) 200 mg capsule Take 1 Cap by mouth daily for 90 days. With food or prn for pain.     losartan-hydroCHLOROthiazide (HYZAAR) 50-12.5 mg per tablet Take 1 Tab by mouth daily. No current facility-administered medications for this visit. Visit Vitals  /88 (BP 1 Location: Left arm, BP Patient Position: Sitting)   Pulse 80   Temp 98.1 °F (36.7 °C) (Oral)   Resp 17   Ht 6' 1\" (1.854 m)   Wt 232 lb 3.2 oz (105.3 kg)   SpO2 98%   BMI 30.64 kg/m²     Body mass index is 30.64 kg/m². Physical Exam   Constitutional: He is well-developed, well-nourished, and in no distress. HENT:   Head: Normocephalic and atraumatic. Eyes: Pupils are equal, round, and reactive to light. Neck: Neck supple. Cardiovascular: Normal rate and regular rhythm. Pulmonary/Chest: Effort normal and breath sounds normal.   Abdominal: Soft. Musculoskeletal: He exhibits no edema. Nursing note and vitals reviewed. REVIEW OF DATA    Labs  No visits with results within 1 Month(s) from this visit.    Latest known visit with results is:   Admission on 02/25/2019, Discharged on 02/25/2019   Component Date Value Ref Range Status    Ventricular Rate 02/25/2019 66  BPM Final    Atrial Rate 02/25/2019 66  BPM Final    P-R Interval 02/25/2019 162  ms Final    QRS Duration 02/25/2019 94  ms Final    Q-T Interval 02/25/2019 378  ms Final    QTC Calculation (Bezet) 02/25/2019 396  ms Final    Calculated P Axis 02/25/2019 52  degrees Final    Calculated R Axis 02/25/2019 24  degrees Final    Calculated T Axis 02/25/2019 66  degrees Final    Diagnosis 02/25/2019    Final                    Value:Normal sinus rhythm  Normal ECG  No previous ECGs available  Confirmed by Olaf Cuellar MD, [facility] ([id number]) on 2/26/2019 10:34:44 AM      WBC 02/25/2019 7.7  4.6 - 13.2 K/uL Final    RBC 02/25/2019 5.29  4.70 - 5.50 M/uL Final    HGB 02/25/2019 14.5  13.0 - 16.0 g/dL Final    HCT 02/25/2019 44.9  36.0 - 48.0 % Final    MCV 02/25/2019 84.9  74.0 - 97.0 FL Final    MCH 02/25/2019 27.4  24.0 - 34.0 PG Final    MCHC 02/25/2019 32.3  31.0 - 37.0 g/dL Final    RDW 02/25/2019 14.0  11.6 - 14.5 % Final    PLATELET 95/92/7840 251  135 - 420 K/uL Final    MPV 02/25/2019 10.1  9.2 - 11.8 FL Final    NEUTROPHILS 02/25/2019 65  42 - 75 % Final    LYMPHOCYTES 02/25/2019 28  20 - 51 % Final    MONOCYTES 02/25/2019 7  2 - 9 % Final    EOSINOPHILS 02/25/2019 0  0 - 5 % Final    BASOPHILS 02/25/2019 0  0 - 3 % Final    ABS. NEUTROPHILS 02/25/2019 5.0  1.8 - 8.0 K/UL Final    ABS. LYMPHOCYTES 02/25/2019 2.2  0.8 - 3.5 K/UL Final    ABS. MONOCYTES 02/25/2019 0.5  0 - 1.0 K/UL Final    ABS. EOSINOPHILS 02/25/2019 0.0  0.0 - 0.4 K/UL Final    ABS. BASOPHILS 02/25/2019 0.0  0.0 - 0.06 K/UL Final    DF 02/25/2019 MANUAL    Final    PLATELET COMMENTS 09/95/6879 ADEQUATE PLATELETS    Final    RBC COMMENTS 02/25/2019 NORMOCYTIC, NORMOCHROMIC    Final    Sodium 02/25/2019 140  136 - 145 mmol/L Final    Potassium 02/25/2019 4.0  3.5 - 5.5 mmol/L Final    Chloride 02/25/2019 105  100 - 108 mmol/L Final    CO2 02/25/2019 27  21 - 32 mmol/L Final    Anion gap 02/25/2019 8  3.0 - 18 mmol/L Final    Glucose 02/25/2019 92  74 - 99 mg/dL Final    BUN 02/25/2019 15  7.0 - 18 MG/DL Final    Creatinine 02/25/2019 1.14  0.6 - 1.3 MG/DL Final    BUN/Creatinine ratio 02/25/2019 13  12 - 20   Final    GFR est AA 02/25/2019 >60  >60 ml/min/1.73m2 Final    GFR est non-AA 02/25/2019 >60  >60 ml/min/1.73m2 Final    Calcium 02/25/2019 8.9  8.5 - 10.1 MG/DL Final    Bilirubin, total 02/25/2019 0.2  0.2 - 1.0 MG/DL Final    ALT (SGPT) 02/25/2019 33  16 - 61 U/L Final    AST (SGOT) 02/25/2019 19  15 - 37 U/L Final    Alk.  phosphatase 02/25/2019 66  45 - 117 U/L Final    Protein, total 02/25/2019 7.9  6.4 - 8.2 g/dL Final    Albumin 02/25/2019 3.9  3.4 - 5.0 g/dL Final    Globulin 02/25/2019 4.0  2.0 - 4.0 g/dL Final    A-G Ratio 02/25/2019 1.0  0.8 - 1.7   Final    CK 02/25/2019 52  39 - 308 U/L Final    CK - MB 02/25/2019 <1.0  <3.6 ng/ml Final    CK-MB Index 02/25/2019 CALCULATION NOT PERFORMED WHEN RESULT IS BELOW LINEAR LIMIT  0.0 - 4.0 % Final    Troponin-I, QT 02/25/2019 <0.02  0.0 - 0.045 NG/ML Final         CT Results (most recent):  Results from [place] encounter on 02/25/19   CT HEAD WO CONT    Narrative EXAM: CT HEAD WO CONT    CLINICAL INDICATION/HISTORY : headache. Headache and hypertension  COMPARISON: None    TECHNIQUE: Helical axial scan was obtained from the skull base to the vertex  without IV contrast administration. All CT scans at this facility are performed using dose optimization of technique  as appropriate to a performed exam, to include automated exposure control,  adjustment of the mA and/or kV according to patient size (including appropriate  matching for site specific examinations), or use of iterative reconstruction  technique. FINDINGS:  The ventricles and sulci are normal in size, shape and position for patient's  age. There is no evidence of abnormal attenuation within the brain. Visualized portion of orbits and sinuses appear unremarkable. No hemorrhage identified. No mass lesion identified. No acute infarction identified. Typical posterior fossa artifacts      Impression IMPRESSION:    1. No evidence of acute intracranial process  2. Unremarkable exam          XR Results (most recent):  Results from Hospital Encounter encounter on 03/21/19   XR HIP LT W OR WO PELV 2-3 VWS    Narrative History: Pain. No comparison    3 frontal views of the pelvis and left hip. Left hip view is frog leg lateral.    FINDINGS:    Bone mineral densities bilaterally decreased which limits evaluation. Pelvic  phleboliths and presumed bone islands noted. Pubic symphysis preserved. Sacroiliac joints symmetric. Slight left hip joint space narrowing with moderate  osteophyte formation. Right hip joint preserved. Bilateral iliopubic lines, ilioischial lines and Shenton's lines preserved. No fracture. Degenerative changes of the lumbar spine partially evaluated. Impression IMPRESSION:    No acute findings. Osteoarthritis left hip. Bilateral osseous sclerotic foci which may be bone islands. Most dominant in the  left iliac bone measuring 1.4 cm. Bone scan can be obtained for clarification. Alternatively short interval follow-up can be obtained. Please see report for additional findings and details. CT   All Micro Results     None              DIAGNOSIS AND PLAN  There is no problem list on file for this patient. 1. Essential hypertension  Optimize BP with losartan 50 mg and HCTZ 12.5 combination pill. Monitor BP closely    2. Acute midline low back pain with left-sided sciatica  3. Retrolisthesis of vertebrae  Lumbar MRI, referral to ortho and trial of diclofenac recommended. Avoid heavy lifting or strenuous activity. Follow-up and Dispositions    · Return in about 2 months (around 6/2/2019). Niharika Green MD

## 2019-05-31 NOTE — PROGRESS NOTES
HPI     Liz Solano. is a 61 y.o. male with HTN, and hypertensive emergency, chronic low back and left hip pain, worse with activity and at night, with shooting sensation from left hip down his left leg, found to have on recent imaging, spondylolisthesis, DJD, atherosclerosis on lumbar X-rays done 3/2/119, and OA of left hip and bilateral osseous sclerotic foci which may be bone islands. Most dominant in the left iliac bone measuring 1.4 cmon Left hip/pelvis x-rays. No motor/sensory changes noted other than radiated hip/leg pain. Reports gabapentin did not cause significant relief and did cause sedation, so patient has stopped taking it. Denies any recent CP, SOB, leg swelling, palpitations. Reports a change in the  of his BP medication after a recent refill caused him weakness, dizziness, it improved after he switched back to previous formulation. Needs a new refill. ROS  As above included in HPI. Otherwise 11 point review of systems negative including constitutional, skin, HENT, eyes, respiratory, cardiovascular, gastrointestinal, genitourinary, musculoskeletal, endocrine, hematologic, allergy, and neurologic. Past Medical History  Past Medical History:   Diagnosis Date    Hypertension      No past surgical history on file. Family History  Family History   Problem Relation Age of Onset    Hypertension Mother     Cancer Father         Throat    Hypertension Father     Cancer Maternal Grandmother     Cancer Maternal Grandfather     Heart Disease Paternal Grandfather        Social History  He  reports that he has never smoked. He uses smokeless tobacco.   Social History     Substance and Sexual Activity   Alcohol Use No    Frequency: Never       Immunization History    There is no immunization history on file for this patient. Allergies  Allergies   Allergen Reactions    Gabapentin Other (comments)     Fatigue.        Medications  Current Outpatient Medications Medication Sig    losartan-hydroCHLOROthiazide (HYZAAR) 50-12.5 mg per tablet Take 1 Tab by mouth daily.  celecoxib (CELEBREX) 200 mg capsule Take 1 Cap by mouth daily for 90 days. With food or prn for pain. No current facility-administered medications for this visit. Visit Vitals  /90 (BP 1 Location: Left arm, BP Patient Position: Sitting)   Pulse 64   Temp 98.9 °F (37.2 °C) (Oral)   Resp 16   Ht 6' 1\" (1.854 m)   Wt 224 lb (101.6 kg)   SpO2 97%   BMI 29.55 kg/m²     Body mass index is 29.55 kg/m². Physical Exam   Constitutional: He is well-developed, well-nourished, and in no distress. HENT:   Head: Normocephalic and atraumatic. Eyes: Pupils are equal, round, and reactive to light. Neck: Neck supple. Cardiovascular: Normal rate and regular rhythm. Pulmonary/Chest: Effort normal and breath sounds normal.   Abdominal: Soft. Musculoskeletal: He exhibits no edema. Nursing note and vitals reviewed. REVIEW OF DATA    Labs  No visits with results within 1 Month(s) from this visit.    Latest known visit with results is:   Admission on 02/25/2019, Discharged on 02/25/2019   Component Date Value Ref Range Status    Ventricular Rate 02/25/2019 66  BPM Final    Atrial Rate 02/25/2019 66  BPM Final    P-R Interval 02/25/2019 162  ms Final    QRS Duration 02/25/2019 94  ms Final    Q-T Interval 02/25/2019 378  ms Final    QTC Calculation (Bezet) 02/25/2019 396  ms Final    Calculated P Axis 02/25/2019 52  degrees Final    Calculated R Axis 02/25/2019 24  degrees Final    Calculated T Axis 02/25/2019 66  degrees Final    Diagnosis 02/25/2019    Final                    Value:Normal sinus rhythm  Normal ECG  No previous ECGs available  Confirmed by Chrissy Martinez MD, Oleg Naidu ([id number]) on 2/26/2019 10:34:44 AM      WBC 02/25/2019 7.7  4.6 - 13.2 K/uL Final    RBC 02/25/2019 5.29  4.70 - 5.50 M/uL Final    HGB 02/25/2019 14.5  13.0 - 16.0 g/dL Final    HCT 02/25/2019 44.9  36.0 - 48.0 % Final    MCV 02/25/2019 84.9  74.0 - 97.0 FL Final    MCH 02/25/2019 27.4  24.0 - 34.0 PG Final    MCHC 02/25/2019 32.3  31.0 - 37.0 g/dL Final    RDW 02/25/2019 14.0  11.6 - 14.5 % Final    PLATELET 04/84/4228 816  135 - 420 K/uL Final    MPV 02/25/2019 10.1  9.2 - 11.8 FL Final    NEUTROPHILS 02/25/2019 65  42 - 75 % Final    LYMPHOCYTES 02/25/2019 28  20 - 51 % Final    MONOCYTES 02/25/2019 7  2 - 9 % Final    EOSINOPHILS 02/25/2019 0  0 - 5 % Final    BASOPHILS 02/25/2019 0  0 - 3 % Final    ABS. NEUTROPHILS 02/25/2019 5.0  1.8 - 8.0 K/UL Final    ABS. LYMPHOCYTES 02/25/2019 2.2  0.8 - 3.5 K/UL Final    ABS. MONOCYTES 02/25/2019 0.5  0 - 1.0 K/UL Final    ABS. EOSINOPHILS 02/25/2019 0.0  0.0 - 0.4 K/UL Final    ABS. BASOPHILS 02/25/2019 0.0  0.0 - 0.06 K/UL Final    DF 02/25/2019 MANUAL    Final    PLATELET COMMENTS 04/18/4227 ADEQUATE PLATELETS    Final    RBC COMMENTS 02/25/2019 NORMOCYTIC, NORMOCHROMIC    Final    Sodium 02/25/2019 140  136 - 145 mmol/L Final    Potassium 02/25/2019 4.0  3.5 - 5.5 mmol/L Final    Chloride 02/25/2019 105  100 - 108 mmol/L Final    CO2 02/25/2019 27  21 - 32 mmol/L Final    Anion gap 02/25/2019 8  3.0 - 18 mmol/L Final    Glucose 02/25/2019 92  74 - 99 mg/dL Final    BUN 02/25/2019 15  7.0 - 18 MG/DL Final    Creatinine 02/25/2019 1.14  0.6 - 1.3 MG/DL Final    BUN/Creatinine ratio 02/25/2019 13  12 - 20   Final    GFR est AA 02/25/2019 >60  >60 ml/min/1.73m2 Final    GFR est non-AA 02/25/2019 >60  >60 ml/min/1.73m2 Final    Calcium 02/25/2019 8.9  8.5 - 10.1 MG/DL Final    Bilirubin, total 02/25/2019 0.2  0.2 - 1.0 MG/DL Final    ALT (SGPT) 02/25/2019 33  16 - 61 U/L Final    AST (SGOT) 02/25/2019 19  15 - 37 U/L Final    Alk.  phosphatase 02/25/2019 66  45 - 117 U/L Final    Protein, total 02/25/2019 7.9  6.4 - 8.2 g/dL Final    Albumin 02/25/2019 3.9  3.4 - 5.0 g/dL Final    Globulin 02/25/2019 4.0  2.0 - 4.0 g/dL Final    A-G Ratio 02/25/2019 1.0  0.8 - 1.7   Final    CK 02/25/2019 52  39 - 308 U/L Final    CK - MB 02/25/2019 <1.0  <3.6 ng/ml Final    CK-MB Index 02/25/2019 CALCULATION NOT PERFORMED WHEN RESULT IS BELOW LINEAR LIMIT  0.0 - 4.0 % Final    Troponin-I, QT 02/25/2019 <0.02  0.0 - 0.045 NG/ML Final         CT Results (most recent):  Results from [place] encounter on 02/25/19   CT HEAD WO CONT    Narrative EXAM: CT HEAD WO CONT    CLINICAL INDICATION/HISTORY : headache. Headache and hypertension  COMPARISON: None    TECHNIQUE: Helical axial scan was obtained from the skull base to the vertex  without IV contrast administration. All CT scans at this facility are performed using dose optimization of technique  as appropriate to a performed exam, to include automated exposure control,  adjustment of the mA and/or kV according to patient size (including appropriate  matching for site specific examinations), or use of iterative reconstruction  technique. FINDINGS:  The ventricles and sulci are normal in size, shape and position for patient's  age. There is no evidence of abnormal attenuation within the brain. Visualized portion of orbits and sinuses appear unremarkable. No hemorrhage identified. No mass lesion identified. No acute infarction identified. Typical posterior fossa artifacts      Impression IMPRESSION:    1. No evidence of acute intracranial process  2. Unremarkable exam          XR Results (most recent):  Results from Hospital Encounter encounter on 03/21/19   XR HIP LT W OR WO PELV 2-3 VWS    Narrative History: Pain. No comparison    3 frontal views of the pelvis and left hip. Left hip view is frog leg lateral.    FINDINGS:    Bone mineral densities bilaterally decreased which limits evaluation. Pelvic  phleboliths and presumed bone islands noted. Pubic symphysis preserved. Sacroiliac joints symmetric. Slight left hip joint space narrowing with moderate  osteophyte formation.  Right hip joint preserved. Bilateral iliopubic lines, ilioischial lines and Shenton's lines preserved. No fracture. Degenerative changes of the lumbar spine partially evaluated. Impression IMPRESSION:    No acute findings. Osteoarthritis left hip. Bilateral osseous sclerotic foci which may be bone islands. Most dominant in the  left iliac bone measuring 1.4 cm. Bone scan can be obtained for clarification. Alternatively short interval follow-up can be obtained. Please see report for additional findings and details. CT   All Micro Results     None              DIAGNOSIS AND PLAN  There is no problem list on file for this patient. 1. Essential hypertension  C/w losartan 50 mg/ HCTZ 12.5 combination pill. Medication refilled. Monitor BP closely. Pain management will help with optimizing BP. 2. Acute midline low back pain with left-sided sciatica  3. Retrolisthesis of vertebrae  Lumbar MRI pending and referral to ortho since last visit  Continue with NSAIDs alone. Did not tolerate gabapentin. Wishes to aovid any opiates. Avoid heavy lifting or strenuous activity recommended. Follow-up and Dispositions    · Return if symptoms worsen or fail to improve. Niharika Barksdale MD

## 2019-07-29 ENCOUNTER — HOSPITAL ENCOUNTER (OUTPATIENT)
Age: 59
Discharge: HOME | End: 2019-07-29
Attending: PHYSICAL MEDICINE & REHABILITATION
Payer: MEDICAID

## 2019-07-29 DIAGNOSIS — M43.16 SPONDYLOLISTHESIS OF LUMBAR REGION: ICD-10-CM

## 2019-07-29 DIAGNOSIS — G95.19 NEUROGENIC CLAUDICATION (HCC): ICD-10-CM

## 2019-07-29 PROCEDURE — 72148 MRI LUMBAR SPINE W/O DYE: CPT

## 2019-07-30 ENCOUNTER — TELEPHONE (OUTPATIENT)
Dept: ORTHOPEDIC SURGERY | Age: 59
End: 2019-07-30

## 2019-07-30 NOTE — TELEPHONE ENCOUNTER
Please put pt on cancellation list.  He can also call daily for cancellations with Dr Cornejo Courser

## 2019-07-30 NOTE — TELEPHONE ENCOUNTER
Patient called stating that he had his MRI completed yesterday (7/29/19) and he did schedule a follow-up appointment for 9/5/19, but he was under the impression he needed to be seen as soon as possible. He asked if there is a way to get him in sooner than 9/5/19.  Please advise patient at [phone]

## 2019-08-01 ENCOUNTER — OFFICE VISIT (OUTPATIENT)
Dept: ORTHOPEDIC SURGERY | Age: 59
End: 2019-08-01

## 2019-08-01 VITALS
HEIGHT: 73 IN | DIASTOLIC BLOOD PRESSURE: 94 MMHG | OXYGEN SATURATION: 97 % | HEART RATE: 62 BPM | SYSTOLIC BLOOD PRESSURE: 172 MMHG | BODY MASS INDEX: 29.16 KG/M2 | TEMPERATURE: 98.2 F | WEIGHT: 220 LBS

## 2019-08-01 DIAGNOSIS — M51.37 DDD (DEGENERATIVE DISC DISEASE), LUMBOSACRAL: Primary | ICD-10-CM

## 2019-08-01 DIAGNOSIS — M48.061 STENOSIS OF LATERAL RECESS OF LUMBAR SPINE: ICD-10-CM

## 2019-08-01 RX ORDER — DULOXETIN HYDROCHLORIDE 30 MG/1
CAPSULE, DELAYED RELEASE ORAL
Qty: 60 CAP | Refills: 1 | Status: SHIPPED | OUTPATIENT
Start: 2019-08-01 | End: 2019-08-12 | Stop reason: ALTCHOICE

## 2019-08-01 NOTE — PATIENT INSTRUCTIONS
Learning About Degenerative Disc Disease  What is degenerative disc disease? Degenerative disc disease is not really a disease. It's a term used to describe the normal changes in your spinal discs as you age. Spinal discs are small, spongy discs that separate the bones (vertebrae) that make up the spine. The discs act as shock absorbers for the spine, so it can flex, bend, and twist.  Degenerative disc disease can take place in one or more places along the spine. It most often occurs in the discs in the lower back and the neck. What causes it? As we age, our spinal discs break down, or degenerate. This breakdown causes the symptoms of degenerative disc disease in some people. When the discs break down, they can lose fluid and dry out, and their outer layers can have tiny cracks or tears. This leads to less padding and less space between the bones in the spine. The body reacts to this by making bony growths on the spine called bone spurs. These spurs can press on the spinal nerve roots or spinal cord. This can cause pain and can affect how well the nerves work. What are the symptoms? Many people with degenerative disc disease have no pain. But others have severe pain or other symptoms that limit their activities. Some of the most common symptoms are:  · Pain in the back or neck. Where the pain occurs depends on which discs are affected. · Pain that gets worse when you move, such as bending over, reaching up, or twisting. · Pain that may occur in the rear end (buttocks), arm, or leg if a nerve is pinched. · Numbness or tingling in your arm or leg. How is it diagnosed? A doctor can often diagnose degenerative disc disease while doing a physical exam. If your exam shows no signs of a serious condition, imaging tests (such as an X-ray) aren't likely to help your doctor find the cause of your symptoms.   Sometimes degenerative disc disease is found when an X-ray is taken for another reason, such as an injury or other health problem. But even if the doctor finds degenerative disc disease, that doesn't always mean that you will have symptoms. How is it treated? There are several things you can do at home to manage pain from this problem. · To relieve pain, use ice or heat (whichever feels better) on the affected area. ? Put ice or a cold pack on the area for 10 to 20 minutes at a time. Put a thin cloth between the ice and your skin. ? Put a warm water bottle, a heating pad set on low, or a warm cloth on your back. Put a thin cloth between the heating pad and your skin. Do not go to sleep with a heating pad on your skin. · Ask your doctor if you can take acetaminophen (such as Tylenol) or nonsteroidal anti-inflammatory drugs, such as ibuprofen or naproxen. Your doctor can prescribe stronger medicines if needed. Be safe with medicines. Read and follow all instructions on the label. · Get some exercise every day. Exercise is one of the best ways to help your back feel better and stay better. It's best to start each exercise slowly. You may notice a little soreness, and that's okay. But if an exercise makes your pain worse, stop doing it. Here are things you can try:  ? Walking. It's the simplest and maybe the best activity for your back. It gets your blood moving and helps your muscles stay strong. ? Exercises that gently stretch and strengthen your stomach, back, and leg muscles. The stronger those muscles are, the better they're able to protect your back. If you have constant or severe pain in your back or spine, you may need other treatments, such as physical therapy. In some cases, your doctor may suggest surgery. Follow-up care is a key part of your treatment and safety. Be sure to make and go to all appointments, and call your doctor if you are having problems. It's also a good idea to know your test results and keep a list of the medicines you take. Where can you learn more?   Go to http://gasper-regina.info/. Enter B376 in the search box to learn more about \"Learning About Degenerative Disc Disease. \"  Current as of: September 20, 2018  Content Version: 12.1  © 4182-9264 [employer]. Care instructions adapted under license by [facility] (which disclaims liability or warranty for this information). If you have questions about a medical condition or this instruction, always ask your healthcare professional. [address] any warranty or liability for your use of this information. Sciatica: Care Instructions  Your Care Instructions    Sciatica (say \"dlj-RU-pa-kuh\") is an irritation of one of the sciatic nerves, which come from the spinal cord in the lower back. The sciatic nerves and their branches extend down through the buttock to the foot. Sciatica can develop when an injured disc in the back presses against a spinal nerve root. Its main symptom is pain, numbness, or weakness that is often worse in the leg or foot than in the back. Sciatica often will improve and go away with time. Early treatment usually includes medicines and exercises to relieve pain. Follow-up care is a key part of your treatment and safety. Be sure to make and go to all appointments, and call your doctor if you are having problems. It's also a good idea to know your test results and keep a list of the medicines you take. How can you care for yourself at home? · Take pain medicines exactly as directed. ? If the doctor gave you a prescription medicine for pain, take it as prescribed. ? If you are not taking a prescription pain medicine, ask your doctor if you can take an over-the-counter medicine. · Use heat or ice to relieve pain. ? To apply heat, put a warm water bottle, heating pad set on low, or warm cloth on your back. Do not go to sleep with a heating pad on your skin.   ? To use ice, put ice or a cold pack on the area for 10 to 20 minutes at a time. Put a thin cloth between the ice and your skin. · Avoid sitting if possible, unless it feels better than standing. · Alternate lying down with short walks. Increase your walking distance as you are able to without making your symptoms worse. · Do not do anything that makes your symptoms worse. When should you call for help? Call 911 anytime you think you may need emergency care. For example, call if:    · You are unable to move a leg at all.   [facility] your doctor now or seek immediate medical care if:    · You have new or worse symptoms in your legs or buttocks. Symptoms may include:  ? Numbness or tingling. ? Weakness. ? Pain.     · You lose bladder or bowel control.    Watch closely for changes in your health, and be sure to contact your doctor if:    · You are not getting better as expected. Where can you learn more? Go to http://gasper-regina.info/. Enter [phone] in the search box to learn more about \"Sciatica: Care Instructions. \"  Current as of: September 20, 2018  Content Version: 12.1  © 6768-7266 [employer]. Care instructions adapted under license by [employer] (which disclaims liability or warranty for this information). If you have questions about a medical condition or this instruction, always ask your healthcare professional. [address] any warranty or liability for your use of this information.

## 2019-08-01 NOTE — PROGRESS NOTES
Collin Christinebenji [facility] [address].  [address], [address]  [place], [address] [place] [address] Street  Phone: [phone]  Fax: [phone]        Casilda Cooks  : 1960  PCP: Natalie Max MD    PROGRESS NOTE      ASSESSMENT AND PLAN    Diagnoses and all orders for this visit:    1. DDD (degenerative disc disease), lumbosacral    2. Stenosis of lateral recess of lumbar spine    Other orders  -     DULoxetine (CYMBALTA) 30 mg capsule; Take 1 tab PO Q Dinner x1 week then increase to 2 tabs PO Q Dinner thereafter       1. Advised to continue HEP. 2. Discussed life style modification, PT, medication, spinal injection, PM, and surgery as treatment options   3. Trial of Cymbalta 30mg 1 tab qdinner x 1 week then 2 tab. 4. Reprint PT referral  5. Avoid repetitive bending, lifting, and twisting. Avoid lifting 25lbs +. 6. Given information on DDD and sciatica    F/U 6 weeks      HISTORY OF PRESENT ILLNESS  Sera Lara is a 61 y.o. male. Last visit pt was sent to have a lumbar spine MRI. Images reviewed with the pt. Last OV referred to PT and given trial of Celebrex. Pt notes some benefit with Celebrex, but not enough to make a significant difference. He states he did not receive a call from PT. He states walking the dog aggravates his back as does other activities. He notes he needs a longer recovery period than he used to. Low back > BLE pain. Location of pain: low back  Does pain radiate into extremities: BLE pain L>R into L foot with numbness/tingling  Does patient have weakness: BLE heaviness   Pt denies saddle paresthesias. Medications pt is on: Tylenol PRN some benefit. Celebrex no benefit. Pt denies recent ED visits or hospitalizations. Denies persistent fevers, chills, weight changes, neurogenic bowel or bladder symptoms. Pt denies hx of SI, HI, depression, kidney stones.     Treatments patient has tried:  Physical therapy:No  Doing HEP: Unknown  Non-opioid medications: Yes Failed diclofenac, BC powders, Gabapentin, Celebrex  Spinal injections: No  Spinal surgery- No.   Last L MRI 2019: DDD L5-S1, mild stenosis L4-5     reviewed. No entries. PMHx of HTN. 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 Pt is applying for disability, first time. Pain Assessment  8/1/2019   Location of Pain Back;Leg;Hip   Location Modifiers Left   Severity of Pain 8   Quality of Pain Aching; Sharp;Dull   Quality of Pain Comment -   Duration of Pain Persistent   Frequency of Pain Constant   Aggravating Factors Walking;Bending   Limiting Behavior -   Relieving Factors Rest   Result of Injury -       MRI Results (most recent):  Results from Hospital Encounter encounter on 07/29/19   MRI LUMB SPINE WO CONT    Narrative PROCEDURE: MRI of the lumbar spine without contrast.    CPT CODE: 42143    INDICATIONS:  PROGRESSIVE LBP, B/L LES, failed NSAIDs, HEP. Chantal January COMPARISONS: Lumbar spine radiographs 3/21/2019. TECHNIQUE: T1 weighted, T2 FSE, and FSE inversion recovery sagittal images are  supplemented by T2 weighted and T1 weighted axial images. FINDINGS:    Trace amount of retrolisthesis of L4 and L5 appears to be degenerative in  origin. Otherwise normal AP alignment. Vertebral body heights are normal.  No acute or chronic fractures. Loss of disc height L5/S1 with disc desiccative changes. L4/L5 disc desiccative  changes with normal disc height. Some fatty marrow endplate changes about the L5/S1 disc level. No marrow edema. T1/T2 hyperintense hemangioma in the T12 vertebral body. Conus terminates at the top of L1 level with normal signal.    Correlation of axial and sagittal data through the disc levels:    T12/L1: Small posterior disc protrusion with slight indentation of the ventral  CSF space but no compression of the conus or nerve roots. No central stenosis. No foraminal narrowing.     L1/2: Disc And Central Canal: Small posterior central disc protrusion with T2  hyperintense annular tear. Indentation of the ventral CSF space. Mild ligamentum  flavum thickening and facet arthropathy but no central stenosis. Facets: Mild bilateral facet arthropathy. Right Foramen: No stenosis. Left Foramen: No stenosis. L2/3: Disc And Central Canal: No significant disc pathology or central stenosis. Facets: No significant arthropathy. Right Foramen: No stenosis. Left Foramen: No stenosis. L3/4: Disc And Central Canal: No focal posterior disc pathology. May be minimal  bulging into the inferior aspect of both neural foramina but no nerve root  impingement or central stenosis. Facets: No significant arthropathy. Right Foramen: No stenosis. Left Foramen: No stenosis. L4/5:  Disc And Central Canal: Posterior central and right central disc  protrusion with annular tear. Contact with the right L5 nerve root in the  lateral recess (axial T2 series 5 image 11). Combines with ligamentum flavum  thickening and trace retrolisthesis there is mild central canal stenosis. Facets: Mild facet DJD, small facet joint effusions. Right Foramen: No stenosis. Left Foramen: No stenosis. L5/S1: Disc And Central Canal: Circumferential disc bulge. More focal posterior  central disc protrusion. Indentation of the ventral CSF space. Encroachment on  the emerging right S1 nerve root (axial T2 series 5 images 5 and 6). No definite  nerve root impingement              Facets: Mild bilateral facet DJD              Right Foramen: Loss of disc and foraminal height with mild to  moderate stenosis. Left Foramen: Loss of disc and foraminal height with mild/moderate  stenosis. Visible Retroperitoneum And Abdomen: Unremarkable. Impression IMPRESSION:    1. L5/S1 degenerative disc disease with loss of disc height, circumferential  disc bulge.  Encroachment on the right S1 nerve root in the lateral recess  without definite impingement. Mild to moderate bilateral foraminal narrowing. 2. L4/L5 disc protrusion with some contact of the right L5 nerve root in the  lateral recess without impingement. 3. Other degenerative changes without central stenosis or nerve root impingement  as described. PAST MEDICAL HISTORY   Past Medical History:   Diagnosis Date    Hypertension        History reviewed. No pertinent surgical history. Dewane Newness MEDICATIONS      Current Outpatient Medications   Medication Sig Dispense Refill    DULoxetine (CYMBALTA) 30 mg capsule Take 1 tab PO Q Dinner x1 week then increase to 2 tabs PO Q Dinner thereafter 60 Cap 1    celecoxib (CELEBREX) 200 mg capsule Take 1 Cap by mouth daily for 90 days. With food or prn for pain. 30 Cap 1    losartan-hydroCHLOROthiazide (HYZAAR) 50-12.5 mg per tablet Take 1 Tab by mouth daily. 90 Tab 3        ALLERGIES    Allergies   Allergen Reactions    Gabapentin Other (comments)     Fatigue.           SOCIAL HISTORY    Social History     Socioeconomic History    Marital status:      Spouse name: Not on file    Number of children: Not on file    Years of education: Not on file    Highest education level: Not on file   Occupational History    Not on file   Social Needs    Financial resource strain: Not on file    Food insecurity:     Worry: Not on file     Inability: Not on file    Transportation needs:     Medical: Not on file     Non-medical: Not on file   Tobacco Use    Smoking status: Never Smoker    Smokeless tobacco: Current User   Substance and Sexual Activity    Alcohol use: No     Frequency: Never    Drug use: Yes     Types: Marijuana    Sexual activity: Yes     Partners: Female   Lifestyle    Physical activity:     Days per week: Not on file     Minutes per session: Not on file    Stress: Not on file   Relationships    Social connections:     Talks on phone: Not on file     Gets together: Not on file     Attends Nondenominational service: Not on file     Active member of club or organization: Not on file     Attends meetings of clubs or organizations: Not on file     Relationship status: Not on file    Intimate partner violence:     Fear of current or ex partner: Not on file     Emotionally abused: Not on file     Physically abused: Not on file     Forced sexual activity: Not on file   Other Topics Concern    Not on file   Social History Narrative    Not on file       FAMILY HISTORY    Family History   Problem Relation Age of Onset    Hypertension Mother     Cancer Father         Throat    Hypertension Father     Cancer Maternal Grandmother     Cancer Maternal Grandfather     Heart Disease Paternal Grandfather        REVIEW OF SYSTEMS  Review of Systems   Constitutional: Negative for chills, fever and weight loss. Respiratory: Negative for shortness of breath. Cardiovascular: Negative for chest pain. Gastrointestinal: Negative for constipation. Negative for fecal incontinence   Genitourinary: Negative for dysuria. Negative for urinary incontinence   Musculoskeletal:        Per HPI   Skin: Negative for rash. Neurological: Positive for tingling. Negative for dizziness, tremors, focal weakness and headaches. Endo/Heme/Allergies: Does not bruise/bleed easily. Psychiatric/Behavioral: The patient does not have insomnia. PHYSICAL EXAMINATION  Visit Vitals  BP (!) 172/94 (BP 1 Location: Left arm, BP Patient Position: Sitting)   Pulse 62   Temp 98.2 °F (36.8 °C) (Oral)   Ht 6' 1\" (1.854 m)   Wt 220 lb (99.8 kg)   SpO2 97%   BMI 29.03 kg/m²         Accompanied by self. Constitutional:  Well developed, well nourished, in no acute distress. Psychiatric: Affect and mood are appropriate. Integumentary: No rashes or abrasions noted on exposed areas. Cardiovascular/Peripheral Vascular: No peripheral edema is noted BLE.       SPINE/MUSCULOSKELETAL EXAM      Lumbar spine:  No rash, ecchymosis, or gross obliquity. No fasciculations. No focal atrophy is noted. Tenderness to palpation L>R L4-5-S1. No tenderness to palpation at the sciatic notch. SI joints non-tender. Trochanters non tender. MOTOR:     Hip Flex  Quads Hamstrings Ankle DF EHL Ankle PF   Right +4/5 +4/5 +4/5 +4/5 +4/5 +4/5   Left +4/5 +4/5 +4/5 +4/5 +4/5 +4/5     DTRs are 2+ patella. Straight Leg raise negative. Ambulation without assistive device. FWB. Written by Cedrick Tripp, as dictated by Pavel Klein MD.    I, Dr. Pavel Klein MD, confirm that all documentation is accurate. Mr. Jean-Claude Rodas may have a reminder for a \"due or due soon\" health maintenance. I have asked that he contact his primary care provider for follow-up on this health maintenance.

## 2019-08-11 PROBLEM — R22.42 HIP MASS, LEFT: Status: ACTIVE | Noted: 2019-08-11

## 2019-08-11 PROBLEM — M51.9 LUMBAR DISC DISEASE: Status: ACTIVE | Noted: 2019-08-11

## 2019-08-11 PROBLEM — I10 ESSENTIAL HYPERTENSION: Status: ACTIVE | Noted: 2019-08-11

## 2019-08-11 NOTE — PROGRESS NOTES
[facility] OF [facility]:  8/12/2019, MRN: [id number]      Shreya Ceballos is a 61 y.o. male and presents to clinic for Establish Care and Hypertension (patient states his blood pressures have been elevated at home )    Subjective: The patient is a 19-year-old male with history of lumbar disc disease and hypertension. 1.  Left Hip Mass: Patient had an incidental finding on the left hip x-ray. The radiologist recommended that patient have a bone scan versus short interval follow-up. No additional studies have been ordered. He continues to have left hip pain. 3/21/19 Left Hip Xray: No acute findings. Osteoarthritis left hip. Bilateral osseous sclerotic foci which may be bone islands. Most dominant in the left iliac bone measuring 1.4 cm. Bone scan can be obtained for clarification. Alternatively short interval follow-up can be obtained. 2. Lumbar Disc Disease: The patient has lower back pain that radiates down his left lower extremity. He was unable to tolerate gabapentin secondary to drowsiness. As result, Cymbalta was ordered. Since then, he took 3 days of cymbalta but stopped after having drowsiness. He is followed by Dr. Kisha Dixon. He reports persistent lower back pain. Radiation of pain: LLE. Associated sx: paresthesias and weakness. Seldomly, he will have pain radiating to his RLE. Lumbar MRI 7/29/19: L5/S1 degenerative disc disease with loss of disc height, circumferential disc bulge. Encroachment on the right S1 nerve root in the lateral recess without definite impingement. Mild to moderate bilateral foraminal narrowing. L4/L5 disc protrusion with some contact of the right L5 nerve root in the lateral recess without impingement. Other degenerative changes without central stenosis or nerve root impingement as described. 3.  Health Maintenance:  Overdue for colon cancer screening per our records. 4.  Hypertension: Present for over 3 months. Taking losartanHCTZ.   No adverse side effects or take this medication. Bp is 152/90 today. No h/o snoring. He dips tobacco and has for several yrs. BP Readings from Last 3 Encounters:   08/12/19 152/90   08/01/19 (!) 172/94   05/06/19 152/88     5. Right Heel Pain: He reports right heel pain off/on but sx have worsened over the past month. No history of trauma. He is concerned he may have a bone spur. Patient Active Problem List    Diagnosis Date Noted    Lumbar disc disease 08/11/2019    Essential hypertension 08/11/2019    Hip mass, left 08/11/2019       Current Outpatient Medications   Medication Sig Dispense Refill    losartan-hydroCHLOROthiazide (HYZAAR) 100-25 mg per tablet Take 1 Tab by mouth daily. 90 Tab 1       Allergies   Allergen Reactions    Gabapentin Other (comments)     Fatigue.  Cymbalta [Duloxetine] Other (comments)     Drowsiness       Past Medical History:   Diagnosis Date    Hypertension        History reviewed. No pertinent surgical history. Family History   Problem Relation Age of Onset    Hypertension Mother     Cancer Father         Throat    Hypertension Father     Cancer Maternal Grandmother     Cancer Maternal Grandfather     Heart Disease Paternal Grandfather        Social History     Tobacco Use    Smoking status: Never Smoker    Smokeless tobacco: Current User     Types: Snuff   Substance Use Topics    Alcohol use: Yes     Frequency: Never     Comment: rare       ROS   Review of Systems   Constitutional: Negative for chills and fever. HENT: Negative for ear pain and sore throat. Eyes: Positive for blurred vision. Negative for pain. Respiratory: Negative for cough and shortness of breath. Cardiovascular: Negative for chest pain. Gastrointestinal: Negative for abdominal pain, blood in stool and melena. Genitourinary: Negative for dysuria and hematuria. Musculoskeletal: Positive for back pain, joint pain and myalgias. Skin: Negative for rash.    Neurological: Negative for focal weakness and headaches. Endo/Heme/Allergies: Does not bruise/bleed easily. Psychiatric/Behavioral: Negative for substance abuse. Objective     Vitals:    08/12/19 1337   BP: 152/90   Pulse: 68   Resp: 18   Temp: 97.6 °F (36.4 °C)   TempSrc: Oral   SpO2: 99%   Weight: 220 lb (99.8 kg)   Height: 6' 1\" (1.854 m)   PainSc:   7   PainLoc: Back       Physical Exam   Constitutional: He is oriented to person, place, and time and well-developed, well-nourished, and in no distress. HENT:   Head: Normocephalic and atraumatic. Right Ear: External ear normal.   Left Ear: External ear normal.   Nose: Nose normal.   Mouth/Throat: Oropharynx is clear and moist. No oropharyngeal exudate. Eyes: Pupils are equal, round, and reactive to light. Conjunctivae and EOM are normal. Right eye exhibits no discharge. Left eye exhibits no discharge. No scleral icterus. Neck: Neck supple. Cardiovascular: Normal rate, regular rhythm, normal heart sounds and intact distal pulses. Pulmonary/Chest: Effort normal and breath sounds normal. No respiratory distress. He has no wheezes. He has no rales. Abdominal: Soft. Bowel sounds are normal. He exhibits no distension. There is no tenderness. There is no rebound and no guarding. Musculoskeletal: He exhibits no edema or tenderness (Bue). His right calcaneus and Achilles tendon are mildly tender to palpation. There are no effusions present. His lumbar spinous processes are mildly tender to palpation. No paraspinal muscles are tender to palpation. He has decreased range of motion along bilateral hip joints secondary to pain. Lymphadenopathy:     He has no cervical adenopathy. Neurological: He is alert and oriented to person, place, and time. He exhibits normal muscle tone. Gait normal.   Skin: Skin is warm and dry. No erythema. Psychiatric: Affect normal.   Nursing note and vitals reviewed.       LABS   Data Review:   Lab Results   Component Value Date/Time    WBC 7.7 02/25/2019 03:35 PM    HGB 14.5 02/25/2019 03:35 PM    HCT 44.9 02/25/2019 03:35 PM    PLATELET 995 50/27/0637 03:35 PM    MCV 84.9 02/25/2019 03:35 PM       Lab Results   Component Value Date/Time    Sodium 140 02/25/2019 03:35 PM    Potassium 4.0 02/25/2019 03:35 PM    Chloride 105 02/25/2019 03:35 PM    CO2 27 02/25/2019 03:35 PM    Anion gap 8 02/25/2019 03:35 PM    Glucose 92 02/25/2019 03:35 PM    BUN 15 02/25/2019 03:35 PM    Creatinine 1.14 02/25/2019 03:35 PM    BUN/Creatinine ratio 13 02/25/2019 03:35 PM    GFR est AA >60 02/25/2019 03:35 PM    GFR est non-AA >60 02/25/2019 03:35 PM    Calcium 8.9 02/25/2019 03:35 PM       No results found for: CHOL, CHOLPOCT, CHOLX, CHLST, CHOLV, HDL, HDLPOC, LDL, LDLCPOC, LDLC, DLDLP, VLDLC, VLDL, TGLX, TRIGL, TRIGP, TGLPOCT, CHHD, CHHDX    No results found for: HBA1C, HGBE8, RWF4UEAI, TWD3KDFE, QNK3CKZK    Assessment/Plan:   1. Hip mass, Left: This is an incidental finding seen on previous x-ray. We discussed his previous x-ray findings and what can cause these findings. All questions were answered. Ordering x-rays of his hips. ORDERS:  - XR HIP LT W OR WO PELV 2-3 VWS; Future  - XR HIP RT W OR WO PELV 2-3 VWS; Future    2. Essential hypertension: BPs have been elevated in our office. We discussed his blood pressure goal less than 140/90. All questions were answered.  Increasing his Hyzaar to 100-25 mg tablet daily. Return to clinic to assess his BP. I encouraged him to reduce his caloric intake. I will recheck his weight at his follow-up appointment    ORDERS:  - losartan-hydroCHLOROthiazide (HYZAAR) 100-25 mg per tablet; Take 1 Tab by mouth daily. Dispense: 90 Tab; Refill: 1    3. Intractable right heel pain: Bone spur? Plantar fasciitis? Achilles tendinitis? Placing a referral to Podiatry  -Calcaneus right heel x-ray ordered. ORDERS:  - REFERRAL TO PODIATRY  - XR CALCANEUS RT; Future    4.   Lumbar Disc Disease: He was unable to tolerate gabapentin and Cymbalta. I encouraged him to follow-up with Blair Carvalho for additional treatment recommendations  Activity as tolerated. 5.  Health Maintenance:   I encouraged him to get colon cancer screening via a colonoscopy or Cologuard testing. He will let me know which when he wants to pursue at his follow-up appointment. We discussed both options. All questions were answered. I will need to order follow-up labs next year at his follow-up appointment. Health Maintenance Due   Topic Date Due    Hepatitis C Screening  1960    DTaP/Tdap/Td series (1 - Tdap) 01/27/1981    Shingrix Vaccine Age 50> (1 of 2) 01/27/2010    FOBT Q 1 YEAR AGE 50-75  01/27/2010     Lab review: labs are reviewed in the EHR    I have discussed the diagnosis with the patient and the intended plan as seen in the above orders. The patient has received an after-visit summary and questions were answered concerning future plans. I have discussed medication side effects and warnings with the patient as well. I have reviewed the plan of care with the patient, accepted their input and they are in agreement with the treatment goals. All questions were answered. The patient understands the plan of care. Handouts provided today with above information. Pt instructed if symptoms worsen to call the office or report to the ED for continued care. Greater than 50% of the visit time was spent in counseling and/or coordination of care. I spent 40 minutes with the patient this encounter, 25 of which were spent counseling him as described above. Voice recognition was used to generate this report, which may have resulted in some phonetic based errors in grammar and contents. Even though attempts were made to correct all the mistakes, some may have been missed, and remained in the body of the document. Follow-up and Dispositions    · Return in about 7 weeks (around 9/30/2019) for BP check, foot pain.          [facility], MD

## 2019-08-12 ENCOUNTER — OFFICE VISIT (OUTPATIENT)
Dept: INTERNAL MEDICINE CLINIC | Age: 59
End: 2019-08-12

## 2019-08-12 VITALS
HEART RATE: 68 BPM | DIASTOLIC BLOOD PRESSURE: 90 MMHG | WEIGHT: 220 LBS | HEIGHT: 73 IN | SYSTOLIC BLOOD PRESSURE: 152 MMHG | TEMPERATURE: 97.6 F | OXYGEN SATURATION: 99 % | RESPIRATION RATE: 18 BRPM | BODY MASS INDEX: 29.16 KG/M2

## 2019-08-12 DIAGNOSIS — I10 ESSENTIAL HYPERTENSION: ICD-10-CM

## 2019-08-12 DIAGNOSIS — M51.9 LUMBAR DISC DISEASE: ICD-10-CM

## 2019-08-12 DIAGNOSIS — M79.671 INTRACTABLE RIGHT HEEL PAIN: ICD-10-CM

## 2019-08-12 DIAGNOSIS — M25.552 BILATERAL HIP PAIN: ICD-10-CM

## 2019-08-12 DIAGNOSIS — R22.42 HIP MASS, LEFT: Primary | ICD-10-CM

## 2019-08-12 DIAGNOSIS — M25.551 BILATERAL HIP PAIN: ICD-10-CM

## 2019-08-12 RX ORDER — LOSARTAN POTASSIUM AND HYDROCHLOROTHIAZIDE 25; 100 MG/1; MG/1
1 TABLET ORAL DAILY
Qty: 90 TAB | Refills: 1 | Status: SHIPPED | OUTPATIENT
Start: 2019-08-12 | End: 2019-09-20 | Stop reason: SDUPTHER

## 2019-08-12 NOTE — PATIENT INSTRUCTIONS
Health Maintenance Due Topic Date Due  
 Hepatitis C Screening  1960  
 DTaP/Tdap/Td series (1 - Tdap) 01/27/1981  Shingrix Vaccine Age 50> (1 of 2) 01/27/2010  
 FOBT Q 1 YEAR AGE 50-75  01/27/2010 High Blood Pressure: Care Instructions Overview It's normal for blood pressure to go up and down throughout the day. But if it stays up, you have high blood pressure. Another name for high blood pressure is hypertension. Despite what a lot of people think, high blood pressure usually doesn't cause headaches or make you feel dizzy or lightheaded. It usually has no symptoms. But it does increase your risk of stroke, heart attack, and other problems. You and your doctor will talk about your risks of these problems based on your blood pressure. Your doctor will give you a goal for your blood pressure. Your goal will be based on your health and your age. Lifestyle changes, such as eating healthy and being active, are always important to help lower blood pressure. You might also take medicine to reach your blood pressure goal. 
Follow-up care is a key part of your treatment and safety. Be sure to make and go to all appointments, and call your doctor if you are having problems. It's also a good idea to know your test results and keep a list of the medicines you take. How can you care for yourself at home? Medical treatment · If you stop taking your medicine, your blood pressure will go back up. You may take one or more types of medicine to lower your blood pressure. Be safe with medicines. Take your medicine exactly as prescribed. Call your doctor if you think you are having a problem with your medicine. · Talk to your doctor before you start taking aspirin every day. Aspirin can help certain people lower their risk of a heart attack or stroke. But taking aspirin isn't right for everyone, because it can cause serious bleeding. · See your doctor regularly. You may need to see the doctor more often at first or until your blood pressure comes down. · If you are taking blood pressure medicine, talk to your doctor before you take decongestants or anti-inflammatory medicine, such as ibuprofen. Some of these medicines can raise blood pressure. · Learn how to check your blood pressure at home. Lifestyle changes · Stay at a healthy weight. This is especially important if you put on weight around the waist. Losing even 10 pounds can help you lower your blood pressure. · If your doctor recommends it, get more exercise. Walking is a good choice. Bit by bit, increase the amount you walk every day. Try for at least 30 minutes on most days of the week. You also may want to swim, bike, or do other activities. · Avoid or limit alcohol. Talk to your doctor about whether you can drink any alcohol. · Try to limit how much sodium you eat to less than 2,300 milligrams (mg) a day. Your doctor may ask you to try to eat less than 1,500 mg a day. · Eat plenty of fruits (such as bananas and oranges), vegetables, legumes, whole grains, and low-fat dairy products. · Lower the amount of saturated fat in your diet. Saturated fat is found in animal products such as milk, cheese, and meat. Limiting these foods may help you lose weight and also lower your risk for heart disease. · Do not smoke. Smoking increases your risk for heart attack and stroke. If you need help quitting, talk to your doctor about stop-smoking programs and medicines. These can increase your chances of quitting for good. When should you call for help? Call 911 anytime you think you may need emergency care. This may mean having symptoms that suggest that your blood pressure is causing a serious heart or blood vessel problem. Your blood pressure may be over 180/120. 
 For example, call 911 if: 
  · You have symptoms of a heart attack. These may include: ? Chest pain or pressure, or a strange feeling in the chest. 
? Sweating. ? Shortness of breath. ? Nausea or vomiting. ? Pain, pressure, or a strange feeling in the back, neck, jaw, or upper belly or in one or both shoulders or arms. ? Lightheadedness or sudden weakness. ? A fast or irregular heartbeat.  
  · You have symptoms of a stroke. These may include: 
? Sudden numbness, tingling, weakness, or loss of movement in your face, arm, or leg, especially on only one side of your body. ? Sudden vision changes. ? Sudden trouble speaking. ? Sudden confusion or trouble understanding simple statements. ? Sudden problems with walking or balance. ? A sudden, severe headache that is different from past headaches.  
  · You have severe back or belly pain.  
 Do not wait until your blood pressure comes down on its own. Get help right away. 
 Call your doctor now or seek immediate care if: 
  · Your blood pressure is much higher than normal (such as 180/120 or higher), but you don't have symptoms.  
  · You think high blood pressure is causing symptoms, such as: 
? Severe headache. 
? Blurry vision.  
 Watch closely for changes in your health, and be sure to contact your doctor if: 
  · Your blood pressure measures higher than your doctor recommends at least 2 times. That means the top number is higher or the bottom number is higher, or both.  
  · You think you may be having side effects from your blood pressure medicine. Where can you learn more? Go to http://gasper-regina.info/. Enter S948 in the search box to learn more about \"High Blood Pressure: Care Instructions. \" Current as of: July 22, 2018 Content Version: 12.1 © 2409-4917 [employer]. Care instructions adapted under license by [employer] (which disclaims liability or warranty for this information).  If you have questions about a medical condition or this instruction, always ask your healthcare professional. [address] any warranty or liability for your use of this information. Colon Cancer Screening: Care Instructions Your Care Instructions Colorectal cancer occurs in the colon or rectum. That's the lower part of your digestive system. It is the second-leading cause of cancer deaths in the [place]. It often starts with small growths called polyps in the colon or rectum. Polyps are usually found with screening tests. Depending on the type of test, any polyps found may be removed during the tests. Colorectal cancer usually does not cause symptoms at first. But regular tests can help find it early, before it spreads and becomes harder to treat. Your risk for colorectal cancer gets higher as you get older. Some experts say that adults should start regular screening at age 48 and stop at age 76. Others say to start before age 48 or continue after age 76. Talk with your doctor about your risk and when to start and stop screening. You may have one of several tests. Follow-up care is a key part of your treatment and safety. Be sure to make and go to all appointments, and call your doctor if you are having problems. It's also a good idea to know your test results and keep a list of the medicines you take. What are the main screening tests for colon cancer? · Stool tests. These include the fecal immunochemical test (FIT) and the fecal occult blood test (FOBT). These tests check stool samples for signs of cancer. If your test is positive, you will need to have a colonoscopy. · Sigmoidoscopy. This test lets your doctor look at the lining of your rectum and the lowest part of your colon. Your doctor uses a lighted tube called a sigmoidoscope. This test can't find cancers or polyps in the upper part of your colon. In some cases, polyps that are found can be removed.  But if your doctor finds polyps, you will need to have a colonoscopy to check the upper part of your colon. · Colonoscopy. This test lets your doctor look at the lining of your rectum and your entire colon. The doctor uses a thin, flexible tool called a colonoscope. It can also be used to remove polyps or get a tissue sample (biopsy). What tests do you need? The following guidelines are for adults who are not at high risk for colorectal cancer. You may have at least one of these tests as directed by your doctor. · Fecal immunochemical test (FIT) or guaiac fecal occult blood test (gFOBT) every year · Sigmoidoscopy every 5 years · Colonoscopy every 10 years If you are over age 76, you can work with your doctor to decide if screening is a good option. Talk with your doctor about when you need to be tested. And discuss which tests are right for you. Your doctor may recommend earlier or more frequent testing if you: 
· Have had colorectal cancer before. · Have had colon polyps. · Have symptoms of colorectal cancer. These include blood in your stool and changes in your bowel habits. · Have a parent, brother or sister, or child with colon polyps or colorectal cancer. · Have a bowel disease. This includes ulcerative colitis and Crohn's disease. · Have a rare polyp syndrome that runs in families, such as familial adenomatous polyposis (FAP). · Have had radiation treatments to the belly or pelvis. When should you call for help? Watch closely for changes in your health, and be sure to contact your doctor if: 
  · You have any changes in your bowel habits.  
  · You have any problems. Where can you learn more? Go to http://gasper-regina.info/. Enter M541 in the search box to learn more about \"Colon Cancer Screening: Care Instructions. \" Current as of: December 19, 2018 Content Version: 12.1 © 0521-1174 [employer].  Care instructions adapted under license by [employer] (which disclaims liability or warranty for this information). If you have questions about a medical condition or this instruction, always ask your healthcare professional. [place] [address] any warranty or liability for your use of this information.

## 2019-08-12 NOTE — PROGRESS NOTES
Chief Complaint   Patient presents with   [facility] Hypertension     patient states his blood pressures have been elevated at home        1. Have you been to the ER, urgent care clinic since your last visit? Hospitalized since your last visit? No    2. Have you seen or consulted any other health care providers outside of the [address] [place] [address] since your last visit? Include any pap smears or colon screening. No    Patient was given a copy of the Advanced Directive and understands to bring it in once completed.   Health Maintenance Due   Topic Date Due    Hepatitis C Screening  1960    DTaP/Tdap/Td series (1 - Tdap) 01/27/1981    Shingrix Vaccine Age 50> (1 of 2) 01/27/2010    FOBT Q 1 YEAR AGE 50-75  01/27/2010

## 2019-08-23 DIAGNOSIS — M43.10 RETROLISTHESIS OF VERTEBRAE: ICD-10-CM

## 2019-08-23 RX ORDER — DICLOFENAC SODIUM 75 MG/1
75 TABLET, DELAYED RELEASE ORAL
Qty: 60 TAB | Refills: 2 | Status: CANCELLED | OUTPATIENT
Start: 2019-08-23

## 2019-08-23 NOTE — TELEPHONE ENCOUNTER
LOV 08/12/2019  F/U 10/01/2019  Patient states [facility] had told him they sent in a refill request for this medicaiton that he last got from Dr. Rebecca Baldwin. I do not see a request in chart. Please review and advise if it can be renewed.

## 2019-08-23 NOTE — TELEPHONE ENCOUNTER
Contacted patient and he was given the information below and instructed to contact DR. Marlow's office for further recommendations. Patient verbalizes understanding.

## 2019-08-23 NOTE — TELEPHONE ENCOUNTER
No refill request in the chart, but DR. Minda Vanessa discontinued this medication stating ineffective on  Discontinue Date:  5/6/2019 10:17 Discontinue User:  Mario Arce MD Discontinue Reason:  Clinically Ineffective     Patient will need to contact Minda Vanessa for this medication or alternative.

## 2019-09-11 ENCOUNTER — HOSPITAL ENCOUNTER (OUTPATIENT)
Dept: GENERAL RADIOLOGY | Age: 59
Discharge: HOME | End: 2019-09-11
Payer: MEDICAID

## 2019-09-11 DIAGNOSIS — M25.551 BILATERAL HIP PAIN: ICD-10-CM

## 2019-09-11 DIAGNOSIS — M25.552 BILATERAL HIP PAIN: ICD-10-CM

## 2019-09-11 DIAGNOSIS — M79.671 INTRACTABLE RIGHT HEEL PAIN: ICD-10-CM

## 2019-09-11 DIAGNOSIS — R22.42 HIP MASS, LEFT: ICD-10-CM

## 2019-09-11 PROCEDURE — 73650 X-RAY EXAM OF HEEL: CPT

## 2019-09-11 PROCEDURE — 73521 X-RAY EXAM HIPS BI 2 VIEWS: CPT

## 2019-09-12 ENCOUNTER — OFFICE VISIT (OUTPATIENT)
Dept: ORTHOPEDIC SURGERY | Age: 59
End: 2019-09-12

## 2019-09-12 VITALS
HEART RATE: 64 BPM | HEIGHT: 73 IN | BODY MASS INDEX: 28.84 KG/M2 | SYSTOLIC BLOOD PRESSURE: 170 MMHG | RESPIRATION RATE: 17 BRPM | TEMPERATURE: 97.9 F | WEIGHT: 217.6 LBS | DIASTOLIC BLOOD PRESSURE: 100 MMHG

## 2019-09-12 DIAGNOSIS — M48.061 STENOSIS OF LATERAL RECESS OF LUMBAR SPINE: ICD-10-CM

## 2019-09-12 DIAGNOSIS — M51.37 DDD (DEGENERATIVE DISC DISEASE), LUMBOSACRAL: Primary | ICD-10-CM

## 2019-09-12 RX ORDER — LOSARTAN POTASSIUM 50 MG/1
TABLET ORAL
Refills: 7 | COMMUNITY
Start: 2019-08-26 | End: 2019-09-20

## 2019-09-12 RX ORDER — DICLOFENAC SODIUM 75 MG/1
TABLET, DELAYED RELEASE ORAL
Qty: 60 TAB | Refills: 2 | Status: SHIPPED | OUTPATIENT
Start: 2019-09-12 | End: 2021-03-09

## 2019-09-12 NOTE — PATIENT INSTRUCTIONS
Neuropathic Pain: Care Instructions  Your Care Instructions    Neuropathic pain is caused by pressure on or damage to your nerves. It's often simply called nerve pain. Some people feel this type of pain all the time. For others, it comes and goes. Diabetes, shingles, or an injury can cause nerve pain. Many people say the pain feels sharp, burning, or stabbing. But some people feel it as a dull ache. In some cases, it makes your skin very sensitive. So touch, pressure, and other sensations that did not hurt before may now cause pain. It's important to know that this kind of pain is real and can affect your quality of life. It's also important to know that treatment can help. Treatment includes pain medicines, exercise, and physical therapy. Medicines can help reduce the number of pain signals that travel over the nerves. This can make the painful areas less sensitive. It can also help you sleep better and improve your mood. But medicines are only one part of successful treatment. Most people do best with more than one kind of treatment. Your doctor may recommend that you try cognitive-behavioral therapy and stress management. Or, if needed, you may decide to try to quit smoking, lower your blood pressure, or better control blood sugar. These kinds of healthy changes can also make a difference. If you feel that your treatment is not working, talk to your doctor. And be sure to tell your doctor if you think you might be depressed or anxious. These are common problems that can also be treated. Follow-up care is a key part of your treatment and safety. Be sure to make and go to all appointments, and call your doctor if you are having problems. It's also a good idea to know your test results and keep a list of the medicines you take. How can you care for yourself at home? · Be safe with medicines. Read and follow all instructions on the label.   ? If the doctor gave you a prescription medicine for pain, take it as prescribed. ? If you are not taking a prescription pain medicine, ask your doctor if you can take an over-the-counter medicine. · Save hard tasks for days when you have less pain. Follow a hard task with an easy task. And remember to take breaks. · Relax, and reduce stress. You may want to try deep breathing or meditation. These can help. · Keep moving. Gentle, daily exercise can help reduce pain. Your doctor or physical therapist can tell you what type of exercise is best for you. This may include walking, swimming, and stationary biking. It may also include stretches and range-of-motion exercises. · Try heat, cold packs, and massage. · Get enough sleep. Constant pain can make you more tired. If the pain makes it hard to sleep, talk with your doctor. · Think positively. Your thoughts can affect your pain. Do fun things to distract yourself from the pain. See a movie, read a book, listen to music, or spend time with a friend. · Keep a pain diary. Try to write down how strong your pain is and what it feels like. Also try to notice and write down how your moods, thoughts, sleep, activities, and medicine affect your pain. These notes can help you and your doctor find the best ways to treat your pain. Reducing constipation caused by pain medicine  Pain medicines often cause constipation. To reduce constipation:  · Include fruits, vegetables, beans, and whole grains in your diet each day. These foods are high in fiber. · Drink plenty of fluids, enough so that your urine is light yellow or clear like water. If you have kidney, heart, or liver disease and have to limit fluids, talk with your doctor before you increase the amount of fluids you drink. · Get some exercise every day. Build up slowly to 30 to 60 minutes a day on 5 or more days of the week. · Take a fiber supplement, such as Citrucel or Metamucil, every day if needed. Read and follow all instructions on the label.   · Schedule time each day for a bowel movement. Having a daily routine may help. Take your time and do not strain when having a bowel movement. · Ask your doctor about a laxative. The goal is to have one easy bowel movement every 1 to 2 days. Do not let constipation go untreated for more than 3 days. When should you call for help? Call your doctor now or seek immediate medical care if:    · You feel sad, anxious, or hopeless for more than a few days. This could mean you are depressed. Depression is common in people who have a lot of pain. But it can be treated.     · You have trouble with bowel movements, such as:  ? No bowel movement in 3 days. ? Blood in the anal area, in your stool, or on the toilet paper. ? Diarrhea for more than 24 hours.    Watch closely for changes in your health, and be sure to contact your doctor if:    · Your pain is getting worse.     · You can't sleep because of pain.     · You are very worried or anxious about your pain.     · You have trouble taking your pain medicine.     · You have any concerns about your pain medicine or its side effects.     · You have vomiting or cramps for more than 2 hours. Where can you learn more? Go to http://gasper-regina.info/. Enter W530 in the search box to learn more about \"Neuropathic Pain: Care Instructions. \"  Current as of: March 28, 2019  Content Version: 12.1  © 1152-3589 [employer]. Care instructions adapted under license by [employer] (which disclaims liability or warranty for this information). If you have questions about a medical condition or this instruction, always ask your healthcare professional. [place] [address] any warranty or liability for your use of this information.

## 2019-09-12 NOTE — PROGRESS NOTES
Verbal order entered per Dr. Sha Wayne as documented on blue sheet:Rferral to PM. Diclofenac 75mg take 1 tab po BID with meals prn pain. Disp 60 with 2 refills    [place]. presents today for   Chief Complaint   Patient presents with    Back Pain     FU       Is someone accompanying this pt? NO    Is the patient using any DME equipment during OV? NO    Depression Screening:  3 most recent PHQ Screens 9/12/2019   Little interest or pleasure in doing things Not at all   Feeling down, depressed, irritable, or hopeless Not at all   Total Score PHQ 2 0       Learning Assessment:  Learning Assessment 8/12/2019   PRIMARY LEARNER Patient   HIGHEST LEVEL OF EDUCATION - PRIMARY LEARNER  GRADUATED HIGH SCHOOL OR GED   BARRIERS PRIMARY LEARNER NONE   CO-LEARNER CAREGIVER No   PRIMARY LANGUAGE ENGLISH   LEARNER PREFERENCE PRIMARY DEMONSTRATION   ANSWERED BY pateint   RELATIONSHIP SELF       Abuse Screening:  Abuse Screening Questionnaire 8/12/2019   Do you ever feel afraid of your partner? N   Are you in a relationship with someone who physically or mentally threatens you? N   Is it safe for you to go home? Y       Coordination of Care:  1. Have you been to the ER, urgent care clinic since your last visit? NO  Hospitalized since your last visit? NO    2. Have you seen or consulted any other health care providers outside of the [address] [place] [address] since your last visit? NO Include any pap smears or colon screening.  NO    Last  Checked 9/12/19

## 2019-09-12 NOTE — PROGRESS NOTES
Please the patient know that his x-ray series does not show any evidence of lesions or fracture.     Dr. Cynthia White  [facility], [facility], [facility] [address]  Phone: [phone]  Fax: [phone]

## 2019-09-12 NOTE — PROGRESS NOTES
Collin Hardy [facility] [address].  [address], [address]  [place], [facility] [address]  Phone: [phone]  Fax: [phone]        Lavelle Talavera  : 1960  PCP: Araceli Garcia MD    PROGRESS NOTE      ASSESSMENT AND PLAN    Diagnoses and all orders for this visit:    1. DDD (degenerative disc disease), lumbosacral  -     REFERRAL TO PAIN MANAGEMENT    2. Stenosis of lateral recess of lumbar spine  -     REFERRAL TO PAIN MANAGEMENT    Other orders  -     diclofenac EC (VOLTAREN) 75 mg EC tablet; Take 1 tab po BID with food as needed for pain       1. Advised to continue HEP. 2. Discussed PT, medication, spinal injection, and PM as treatment options   3. Pt declines spinal injections  4. Referral to PM  5. Restart Diclofenac BID PRN- feels this helped him some. 6. Discussed PT. Pt willing to try. Nurses noted PT unable to reach pt, contact info changed. 7. Given information on neuropathic pain    F/U PRN      HISTORY OF PRESENT ILLNESS  Kentrell Fragoso is a 61 y.o. male. Pt presents to the office for a f/u visit for stenosis. Last OV given trial of Cymbalta and referred to PT. Pt reports Cymbalta caused grogginess, fatigue. He denies receiving phone call for PT. He c/o intense R foot pain. He notes stable low back and BLE pain. He reports about 25 minutes walking tolerance while mowing the lawn. He states he can walk 10-15 minutes around the block. Wants something stronger for pain. Patient unclear about which meds he has taken for pain, reviewed EMR meds with him. Location of pain: low back  Does pain radiate into extremities: BLE pain L>R into L foot with numbness/tingling. R foot pain. Does patient have weakness: BLE heaviness   Pt denies saddle paresthesias.    Medications pt is on: Tylenol PRN some benefit. Cymbalta with fatigue. Pt denies recent ED visits or hospitalizations.  Denies persistent fevers, chills, weight changes, neurogenic bowel or bladder symptoms. Treatments patient has tried:  Physical therapy:No  Doing HEP: Unknown  Non-opioid medications: Yes Failed BC powders, Gabapentin- fatigue, Celebrex, Cymbalta - fatigue  Spinal injections: No  Spinal surgery- No.   Last L MRI 2019: DDD L5-S1, mild stenosis L4-5     reviewed. PMHx of HTN.   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 Pt is applying for disability, first time. Pain Assessment  9/12/2019   Location of Pain Back;Leg   Location Modifiers Left   Severity of Pain 9   Quality of Pain Aching; Throbbing;Burning   Quality of Pain Comment numbness tingling   Duration of Pain Persistent   Frequency of Pain Constant   Aggravating Factors (No Data)   Aggravating Factors Comment pain is just there   Limiting Behavior -   Relieving Factors (No Data)   Relieving Factors Comment tylenol may help a little   Result of Injury -       PAST MEDICAL HISTORY   Past Medical History:   Diagnosis Date    Hypertension        History reviewed. No pertinent surgical history. Fidelina Elaine MEDICATIONS      Current Outpatient Medications   Medication Sig Dispense Refill    losartan (COZAAR) 50 mg tablet TAKE 1 TABLET BY MOUTH EVERY DAY  7    diclofenac EC (VOLTAREN) 75 mg EC tablet Take 1 tab po BID with food as needed for pain 60 Tab 2    losartan-hydroCHLOROthiazide (HYZAAR) 100-25 mg per tablet Take 1 Tab by mouth daily. 90 Tab 1        ALLERGIES    Allergies   Allergen Reactions    Gabapentin Other (comments)     Fatigue.  Cymbalta [Duloxetine] Other (comments)     Drowsiness.  Fatigue          SOCIAL HISTORY    Social History     Socioeconomic History    Marital status:      Spouse name: Not on file    Number of children: Not on file    Years of education: Not on file    Highest education level: Not on file   Occupational History    Not on file   Social Needs    Financial resource strain: Not on file    Food insecurity:     Worry: Not on file     Inability: Not on file    Transportation needs: Medical: Not on file     Non-medical: Not on file   Tobacco Use    Smoking status: Never Smoker    Smokeless tobacco: Current User     Types: Snuff   Substance and Sexual Activity    Alcohol use: Yes     Frequency: Never     Comment: rare    Drug use: Yes     Types: Marijuana    Sexual activity: Yes     Partners: Female   Lifestyle    Physical activity:     Days per week: Not on file     Minutes per session: Not on file    Stress: Not on file   Relationships    Social connections:     Talks on phone: Not on file     Gets together: Not on file     Attends [employer] service: Not on file     Active member of club or organization: Not on file     Attends meetings of clubs or organizations: Not on file     Relationship status: Not on file    Intimate partner violence:     Fear of current or ex partner: Not on file     Emotionally abused: Not on file     Physically abused: Not on file     Forced sexual activity: Not on file   Other Topics Concern    Not on file   Social History Narrative    Not on file       FAMILY HISTORY    Family History   Problem Relation Age of Onset    Hypertension Mother     Cancer Father         Throat    Hypertension Father     Cancer Maternal Grandmother     Cancer Maternal Grandfather     Heart Disease Paternal Grandfather        REVIEW OF SYSTEMS  Review of Systems   Constitutional: Negative for chills, fever and weight loss. Respiratory: Negative for shortness of breath. Cardiovascular: Negative for chest pain. Gastrointestinal: Negative for constipation. Negative for fecal incontinence   Genitourinary: Negative for dysuria. Negative for urinary incontinence   Musculoskeletal:        Per HPI   Skin: Negative for rash. Neurological: Positive for tingling. Negative for dizziness, tremors, focal weakness and headaches. Endo/Heme/Allergies: Does not bruise/bleed easily. Psychiatric/Behavioral: The patient does not have insomnia.         PHYSICAL EXAMINATION  Visit Vitals  BP (!) 170/100 (BP 1 Location: Left arm, BP Patient Position: Sitting)   Pulse 64   Temp 97.9 °F (36.6 °C) (Oral)   Resp 17   Ht 6' 1\" (1.854 m)   Wt 217 lb 9.6 oz (98.7 kg)   BMI 28.71 kg/m²         Accompanied by self. Constitutional:  Well developed, well nourished, in no acute distress. Psychiatric: Affect and mood are appropriate. Integumentary: No rashes or abrasions noted on exposed areas. Cardiovascular/Peripheral Vascular: No peripheral edema is noted BLE. SPINE/MUSCULOSKELETAL EXAM      Lumbar spine:  No rash, ecchymosis, or gross obliquity. No fasciculations. No focal atrophy is noted. Tenderness to palpation L L4-5, L L5-S1. No tenderness to palpation at the sciatic notch. SI joints non-tender. Trochanters non tender. MOTOR:       Hip Flex  Quads Hamstrings Ankle DF EHL Ankle PF   Right +4/5 +4/5 +4/5 +4/5 +4/5 +4/5   Left +4/5 +4/5 +4/5 +4/5 +4/5 +4/5     Straight Leg raise negative. Pain with L knee ROM. Ambulation without assistive device. FWB. Written by Isa Walton, as dictated by Soto Bowers MD.    I, Dr. Soto Bowers MD, confirm that all documentation is accurate. Mr. Master Chi may have a reminder for a \"due or due soon\" health maintenance. I have asked that he contact his primary care provider for follow-up on this health maintenance.

## 2019-09-13 ENCOUNTER — TELEPHONE (OUTPATIENT)
Dept: INTERNAL MEDICINE CLINIC | Age: 59
End: 2019-09-13

## 2019-09-13 NOTE — LETTER
9/16/2019 8:21 AM 
 
Mr. Harpreet Hemphill. [id number] [address] Dear Mr Shantel Rondon, Our office has tried several days to reach you by phone, however we could not. Please have our office update your phone contact number when you follow up with Dr Tushar Bob. Dr Tushar Bob would like you to have the following results: 
 
Please the patient know that his x-ray series does not show any evidence of lesions or fracture. Please call our office with any questions, and or concerns. Have a great day. Sincerely, Meryle Locks for Dr. Mayela Kim [facility] [address] [place] [address] 207, [facility] [facility], [address]

## 2019-09-13 NOTE — TELEPHONE ENCOUNTER
----- Message from Crystal Guillen MD sent at 9/12/2019  7:14 PM EDT -----  Please the patient know that his x-ray series does not show any evidence of lesions or fracture.     Dr. Sanchez   [facility], [address] [place] [address], [facility] [address]  Phone: [phone]  Fax: [phone]

## 2019-09-13 NOTE — TELEPHONE ENCOUNTER
----- Message from Efren Mcelroy MD sent at 9/12/2019  6:54 PM EDT -----  Please let him know that his x-ray does not show any evidence of fracture. There are no abnormalities.     Dr. Panchal Record  [facility], [address] [place] [address], [facility]  Phone: [phone]  Fax: [phone]

## 2019-09-13 NOTE — TELEPHONE ENCOUNTER
Chief Complaint   Patient presents with    Results     done 9-11-19 Xray Hips Bilateral w/     9-13-19 Patient reached and 2 identifiers were used: Full Name, and Date of Birth verified. The patient was given the results, and understands all. The patient reminded of his referral to [address] [place] [address] and was given their contact number, so that he can go ahead and make his appointment to be seen for his continued Right Foot pain.

## 2019-09-16 NOTE — TELEPHONE ENCOUNTER
Pt returned call and was given this information. He also wants Dr to know his bp is still running high. Was 170/110 when he went to Dr. Sheryl Lopez. Wants to know what he should do about it? He had no other reading he could give me.

## 2019-09-16 NOTE — TELEPHONE ENCOUNTER
Tried to call pt to change Friday appt from 12:15pm to 1:45 per written note from Dr. Beti Padilla.      If pt calls please give him corrected time

## 2019-09-16 NOTE — TELEPHONE ENCOUNTER
Chief Complaint   Patient presents with    Results     xray Hips BI W AP Pelv; Calcaneous Right performed 9-11-19 per Dr Saucedo Sunflower Letter     Result Letter sent, due to not being able to reach the patient by phone for many days     9-16-19 unable to leave a voice message, due to [id number]. I have sent a Result Letter to the patient, due to not being able to reach the patient by phone.

## 2019-09-16 NOTE — TELEPHONE ENCOUNTER
His BP was likely falsely elevated secondary to pain/anxiety. His BP was better in our office but was still not at goal.  He should be taking the higher dose of his BP medication as discussed at his last appointment. Please schedule for 12:15 this Friday to see me for BP check if he can come in at that time.     Dr. Hiral Mortensen  [facility] of [address], [address] [place] [address] Str.  Phone: [phone]  Fax: [phone]

## 2019-09-20 ENCOUNTER — TELEPHONE (OUTPATIENT)
Dept: INTERNAL MEDICINE CLINIC | Age: 59
End: 2019-09-20

## 2019-09-20 ENCOUNTER — OFFICE VISIT (OUTPATIENT)
Dept: INTERNAL MEDICINE CLINIC | Age: 59
End: 2019-09-20

## 2019-09-20 VITALS
BODY MASS INDEX: 28.65 KG/M2 | HEART RATE: 73 BPM | HEIGHT: 73 IN | TEMPERATURE: 98.2 F | RESPIRATION RATE: 12 BRPM | OXYGEN SATURATION: 96 % | SYSTOLIC BLOOD PRESSURE: 145 MMHG | DIASTOLIC BLOOD PRESSURE: 91 MMHG | WEIGHT: 216.2 LBS

## 2019-09-20 DIAGNOSIS — I10 ESSENTIAL HYPERTENSION: ICD-10-CM

## 2019-09-20 DIAGNOSIS — M79.671 INTRACTABLE RIGHT HEEL PAIN: Primary | ICD-10-CM

## 2019-09-20 RX ORDER — LOSARTAN POTASSIUM AND HYDROCHLOROTHIAZIDE 25; 100 MG/1; MG/1
1 TABLET ORAL DAILY
Qty: 90 TAB | Refills: 3 | Status: SHIPPED | OUTPATIENT
Start: 2019-09-20 | End: 2019-10-31

## 2019-09-20 RX ORDER — DICLOFENAC SODIUM 16.05 MG/ML
SOLUTION TOPICAL
COMMUNITY
Start: 2019-09-18 | End: 2019-09-20 | Stop reason: ALTCHOICE

## 2019-09-20 RX ORDER — LIDOCAINE 50 MG/G
OINTMENT TOPICAL
COMMUNITY
Start: 2019-09-18 | End: 2021-06-09

## 2019-09-20 NOTE — PATIENT INSTRUCTIONS
Please bring all prescribed boxes and medication bottles to the next office visit for review, patient stated he understands. Managing Other Conditions When You Have Heart Failure: Care Instructions Your Care Instructions All the systems in your body rely on each other to work properly. Heart failure has effects all through your body that can lead to other problems, such as kidney disease. The reverse is also true. A condition like diabetes or lung disease can damage or stress your heart and cause heart failure. Managing any other problems can help reduce your heart's workload and make your heart failure better. Conditions that commonly cause or occur along with heart failure include high blood pressure, diabetes, COPD, high cholesterol, kidney problems, anemia, and arthritis. Follow-up care is a key part of your treatment and safety. Be sure to make and go to all appointments, and call your doctor if you are having problems. It's also a good idea to know your test results and keep a list of the medicines you take. How can you care for yourself at home? Steps to help with heart failure and other problems · Eat less salt (sodium). This helps keep fluid from building up. It may help you feel better. Limiting sodium can also help if you have high blood pressure or kidney disease. · Watch your fluid intake if your doctor tells you to. Reducing fluids can ease your heart's workload and keep your sodium level in balance. · Get regular exercise. Regular, moderate exercise, such as walking, helps your heart. It can also help lower your blood pressure, lower stress, and help you lose weight. · Lose weight if you are overweight. Losing weight can help you manage diabetes, lower your blood pressure and cholesterol level, and reduce the workload on your heart. · Stop smoking. Smoking stresses your lungs, interferes with healing, and can make heart failure worse. · Limit alcohol. Alcohol can raise your blood pressure. Ask your doctor how much, if any, is safe. If your doctor has not set you up with a cardiac rehabilitation (rehab) program, talk to him or her about whether that is right for you. Cardiac rehab includes exercise, help with diet and lifestyle changes, and emotional support. To stay as healthy as possible · Work closely with your doctor. Have all your tests, and go to all your appointments. · Take your medicines exactly as prescribed. You will take medicines to treat the other conditions you have along with heart failure. It can be hard to balance the treatment for all your conditions. You will need to have follow-up tests to make sure that all your medicines are working well together. Talk to your doctor if you have any problems with your medicine. · Keep all your doctors informed about your health problems and all the medicines you take for them. Medicines that can treat one condition may make another condition worse. · Talk to your doctor before you take any vitamins, over-the-counter drugs, or herbal products. Do not take aspirin, ibuprofen (Advil, Motrin), or naproxen (Aleve) unless you talk to your doctor first. They could make your heart failure and other problems worse. When should you call for help? Call 911 anytime you think you may need emergency care. For example, call if: 
  · You have symptoms of sudden heart failure, such as: 
? Severe trouble breathing. ? Coughing up pink, foamy mucus. ? A new irregular or rapid heartbeat.  
  · You have symptoms of a heart attack. These may include: 
? Chest pain or pressure, or a strange feeling in the chest. 
? Sweating. ? Shortness of breath. ? Nausea or vomiting. ? Pain, pressure, or a strange feeling in the back, neck, jaw, or upper belly or in one or both shoulders or arms. ? Lightheadedness or sudden weakness. ? A fast or irregular heartbeat.  After you call  911, the  may tell you to chew 1 adult-strength or 2 to 4 low-dose aspirin. Wait for an ambulance. Do not try to drive yourself. 
 Call your doctor now or seek immediate medical care if: 
  · You have new or increased shortness of breath.  
  · You are dizzy or lightheaded, or you feel like you may faint.  
  · You have sudden weight gain, such as more than 2 to 3 pounds in a day or 5 pounds in a week. (Your doctor may suggest a different range of weight gain.)  
  · You have increased swelling in your legs, ankles, or feet.  
  · You are suddenly so tired or weak that you cannot do your usual activities.  
 Watch closely for changes in your health, and be sure to contact your doctor if you develop new symptoms. Where can you learn more? Go to http://gasperDine perfectregina.info/. Enter P052 in the search box to learn more about \"Managing Other Conditions When You Have Heart Failure: Care Instructions. \" Current as of: April 9, 2019 Content Version: 12.2 © 3114-1416 [employer]. Care instructions adapted under license by [employer] (which disclaims liability or warranty for this information). If you have questions about a medical condition or this instruction, always ask your healthcare professional. [address] any warranty or liability for your use of this information. DASH Diet: Care Instructions Your Care Instructions The DASH diet is an eating plan that can help lower your blood pressure. DASH stands for Dietary Approaches to Stop Hypertension. Hypertension is high blood pressure. The DASH diet focuses on eating foods that are high in calcium, potassium, and magnesium. These nutrients can lower blood pressure. The foods that are highest in these nutrients are fruits, vegetables, low-fat dairy products, nuts, seeds, and legumes.  But taking calcium, potassium, and magnesium supplements instead of eating foods that are high in those nutrients does not have the same effect. The DASH diet also includes whole grains, fish, and poultry. The DASH diet is one of several lifestyle changes your doctor may recommend to lower your high blood pressure. Your doctor may also want you to decrease the amount of sodium in your diet. Lowering sodium while following the DASH diet can lower blood pressure even further than just the DASH diet alone. Follow-up care is a key part of your treatment and safety. Be sure to make and go to all appointments, and call your doctor if you are having problems. It's also a good idea to know your test results and keep a list of the medicines you take. How can you care for yourself at home? Following the DASH diet · Eat 4 to 5 servings of fruit each day. A serving is 1 medium-sized piece of fruit, ½ cup chopped or canned fruit, 1/4 cup dried fruit, or 4 ounces (½ cup) of fruit juice. Choose fruit more often than fruit juice. · Eat 4 to 5 servings of vegetables each day. A serving is 1 cup of lettuce or raw leafy vegetables, ½ cup of chopped or cooked vegetables, or 4 ounces (½ cup) of vegetable juice. Choose vegetables more often than vegetable juice. · Get 2 to 3 servings of low-fat and fat-free dairy each day. A serving is 8 ounces of milk, 1 cup of yogurt, or 1 ½ ounces of cheese. · Eat 6 to 8 servings of grains each day. A serving is 1 slice of bread, 1 ounce of dry cereal, or ½ cup of cooked rice, pasta, or cooked cereal. Try to choose whole-grain products as much as possible. · Limit lean meat, poultry, and fish to 2 servings each day. A serving is 3 ounces, about the size of a deck of cards. · Eat 4 to 5 servings of nuts, seeds, and legumes (cooked dried beans, lentils, and split peas) each week. A serving is 1/3 cup of nuts, 2 tablespoons of seeds, or ½ cup of cooked beans or peas. · Limit fats and oils to 2 to 3 servings each day. A serving is 1 teaspoon of vegetable oil or 2 tablespoons of salad dressing. · Limit sweets and added sugars to 5 servings or less a week. A serving is 1 tablespoon jelly or jam, ½ cup sorbet, or 1 cup of lemonade. · Eat less than 2,300 milligrams (mg) of sodium a day. If you limit your sodium to 1,500 mg a day, you can lower your blood pressure even more. Tips for success · Start small. Do not try to make dramatic changes to your diet all at once. You might feel that you are missing out on your favorite foods and then be more likely to not follow the plan. Make small changes, and stick with them. Once those changes become habit, add a few more changes. · Try some of the following: ? Make it a goal to eat a fruit or vegetable at every meal and at snacks. This will make it easy to get the recommended amount of fruits and vegetables each day. ? Try yogurt topped with fruit and nuts for a snack or healthy dessert. ? Add lettuce, tomato, cucumber, and onion to sandwiches. ? Combine a ready-made pizza crust with low-fat mozzarella cheese and lots of vegetable toppings. Try using tomatoes, squash, spinach, broccoli, carrots, cauliflower, and onions. ? Have a variety of cut-up vegetables with a low-fat dip as an appetizer instead of chips and dip. ? Sprinkle sunflower seeds or chopped almonds over salads. Or try adding chopped walnuts or almonds to cooked vegetables. ? Try some vegetarian meals using beans and peas. Add garbanzo or kidney beans to salads. Make burritos and tacos with mashed montalvo beans or black beans. Where can you learn more? Go to http://gasper-regina.info/. Enter X273 in the search box to learn more about \"DASH Diet: Care Instructions. \" Current as of: April 9, 2019 Content Version: 12.2 © 8257-8520 [employer].  Care instructions adapted under license by [address] (which disclaims liability or warranty for this information). If you have questions about a medical condition or this instruction, always ask your healthcare professional. [address] any warranty or liability for your use of this information.

## 2019-09-20 NOTE — PROGRESS NOTES
INTERNISTS OF [facility] [place]:  9/20/2019, MRN: [id number]      Bimal Lomas is a 61 y.o. male and presents to clinic for Hypertension (follow up   ROOM  3)    Subjective: The patient is a 66-year-old male with history of lumbar disc disease and hypertension. 1. HTN: His hyzaar was increased at his last apt. He never picked up the rx. There is an issue with his insurance and the pharmacy. 2. Right Heel Pain: He was referred to Podiatry at her last apt. Since then, he met with Podiatry this week on Tuesday. S/p steroid shot along his heel. Since then, his sx are improving. Topical rx were also prescribed. He is just starting to do some stretching exercises. He goes back to Podiatry in 2 weeks. The Podiatry team is also recommending special arch supports. Patient Active Problem List    Diagnosis Date Noted    Lumbar disc disease 08/11/2019    Essential hypertension 08/11/2019    Hip mass, left 08/11/2019       Current Outpatient Medications   Medication Sig Dispense Refill    losartan (COZAAR) 50 mg tablet TAKE 1 TABLET BY MOUTH EVERY DAY  7    lidocaine (XYLOCAINE) 5 % ointment       diclofenac EC (VOLTAREN) 75 mg EC tablet Take 1 tab po BID with food as needed for pain 60 Tab 2    losartan-hydroCHLOROthiazide (HYZAAR) 100-25 mg per tablet Take 1 Tab by mouth daily. 90 Tab 1       Allergies   Allergen Reactions    Gabapentin Other (comments)     Fatigue.  Cymbalta [Duloxetine] Other (comments)     Drowsiness. Fatigue       Past Medical History:   Diagnosis Date    Hypertension        No past surgical history on file.     Family History   Problem Relation Age of Onset    Hypertension Mother     Cancer Father         Throat    Hypertension Father     Cancer Maternal Grandmother     Cancer Maternal Grandfather     Heart Disease Paternal Grandfather        Social History     Tobacco Use    Smoking status: Never Smoker    Smokeless tobacco: Current User     Types: Snuff   Substance Use Topics    Alcohol use: Yes     Frequency: Never     Comment: rare       ROS   Review of Systems   Constitutional: Negative for chills and fever. HENT: Negative for ear pain and sore throat. Eyes: Negative for blurred vision and pain. Respiratory: Negative for cough and shortness of breath. Cardiovascular: Negative for chest pain. Gastrointestinal: Negative for abdominal pain, blood in stool and melena. Genitourinary: Negative for dysuria and hematuria. Musculoskeletal: Negative for joint pain and myalgias. Skin: Negative for rash. Neurological: Negative for tingling, focal weakness and headaches. Endo/Heme/Allergies: Does not bruise/bleed easily. Psychiatric/Behavioral: Negative for substance abuse. Objective     Vitals:    09/20/19 1358 09/20/19 1406   BP: 142/89 146/89   Pulse: 80 80   Resp: 12 12   Temp: 98.2 °F (36.8 °C)    TempSrc: Oral    SpO2: 96%    Weight: 216 lb 3.2 oz (98.1 kg)    Height: 6' 1\" (1.854 m)    PainSc:   8    PainLoc: Hip        Physical Exam   Constitutional: He is oriented to person, place, and time and well-developed, well-nourished, and in no distress. HENT:   Head: Normocephalic and atraumatic. Right Ear: External ear normal.   Left Ear: External ear normal.   Nose: Nose normal.   Mouth/Throat: Oropharynx is clear and moist. No oropharyngeal exudate. Eyes: Pupils are equal, round, and reactive to light. Conjunctivae and EOM are normal. Right eye exhibits no discharge. Left eye exhibits no discharge. No scleral icterus. Neck: Neck supple. Cardiovascular: Normal rate, regular rhythm, normal heart sounds and intact distal pulses. Pulmonary/Chest: Effort normal and breath sounds normal. No respiratory distress. Abdominal: Soft. Bowel sounds are normal. He exhibits no distension. There is no tenderness. Musculoskeletal: He exhibits no edema or tenderness (BUE). Lymphadenopathy:     He has no cervical adenopathy.    Neurological: He is alert and oriented to person, place, and time. He exhibits normal muscle tone. Gait normal.   Skin: Skin is warm and dry. No erythema. Psychiatric: Affect normal.   Nursing note and vitals reviewed. LABS   Data Review:   Lab Results   Component Value Date/Time    WBC 7.7 02/25/2019 03:35 PM    HGB 14.5 02/25/2019 03:35 PM    HCT 44.9 02/25/2019 03:35 PM    PLATELET 134 72/16/5402 03:35 PM    MCV 84.9 02/25/2019 03:35 PM       Lab Results   Component Value Date/Time    Sodium 140 02/25/2019 03:35 PM    Potassium 4.0 02/25/2019 03:35 PM    Chloride 105 02/25/2019 03:35 PM    CO2 27 02/25/2019 03:35 PM    Anion gap 8 02/25/2019 03:35 PM    Glucose 92 02/25/2019 03:35 PM    BUN 15 02/25/2019 03:35 PM    Creatinine 1.14 02/25/2019 03:35 PM    BUN/Creatinine ratio 13 02/25/2019 03:35 PM    GFR est AA >60 02/25/2019 03:35 PM    GFR est non-AA >60 02/25/2019 03:35 PM    Calcium 8.9 02/25/2019 03:35 PM       Assessment/Plan:   1. Essential hypertension:   - Ordering Hyzaar for the pt to take at 100-25mg daily. I emphasized for him to take this. We confirmed with his pharmacy and they received this prescription.  Return to clinic for BP check. ORDERS:  - losartan-hydroCHLOROthiazide (HYZAAR) 100-25 mg per tablet; Take 1 Tab by mouth daily. Dispense: 90 Tab; Refill: 3    2. Right Heel Pain:   Continue to follow [facility] Maintenance Due   Topic Date Due    Hepatitis C Screening  1960    DTaP/Tdap/Td series (1 - Tdap) 01/27/1981    Shingrix Vaccine Age 50> (1 of 2) 01/27/2010    FOBT Q 1 YEAR AGE 50-75  01/27/2010     Lab review: labs are reviewed in the EHR    I have discussed the diagnosis with the patient and the intended plan as seen in the above orders. The patient has received an after-visit summary and questions were answered concerning future plans. I have discussed medication side effects and warnings with the patient as well.  I have reviewed the plan of care with the patient, accepted their input and they are in agreement with the treatment goals. All questions were answered. The patient understands the plan of care. Handouts provided today with above information. Pt instructed if symptoms worsen to call the office or report to the ED for continued care. Greater than 50% of the visit time was spent in counseling and/or coordination of care. Voice recognition was used to generate this report, which may have resulted in some phonetic based errors in grammar and contents. Even though attempts were made to correct all the mistakes, some may have been missed, and remained in the body of the document.           Lissett Garcia MD

## 2019-09-20 NOTE — TELEPHONE ENCOUNTER
Chief Complaint   Patient presents with    Medication Evaluation     per Dr Samantha Hernandez I am checking with the patient's [facility] on [facility]. to verify what prescriptions the patient has received     9-20-19 per Dr Samantha Hernandez the patient's pharmacy reached to verify medications the patient has received, and 2 identifiers were used: Full Name, and Date of Birth also verified. Pharmacy Tech verified the patient picked up on 8-26-19 medication refill on Losartan 50 mg. The patient picked up on 8-12-19 medication  Losartan-Hydrochlorothiazide 100-25 mg. The information has been given to Dr Samantha Hernandez for review. After reviewing this encounter Dr Samantha Hernandez  gave a verbal to have the pharmacist reached to discontinue the Losartan 50 mg, and Dr Samantha Hernandez has sent an electronic refill on Losartan-Hydrochlorothiazide 100-25 mg take one tablet by mouth once daily, Qty 90, Refill 3. The pharmacist gave a verbal verification on receiving the refill order today, and understands to take out of the system the Losartan 50 mg medication. The patient has been made aware of this decision, and refill order while here in the office today for a follow up on hypertension, and will go to his pharmacy today to  the prescription. All understood.

## 2019-09-20 NOTE — PROGRESS NOTES
Chief Complaint   Patient presents with    Hypertension     follow up   ROOM  3     Patient instructed to bring all prescribed medications to the next office visit for review, due to not being sure today what he is taking. The patient is unclear when asked on all listed medications, and is only stating he is taking Losartan 50 mg -two tablets once a day. The patient understands the importance for us to be able to view prescribed containers while here in the office. 1. Have you been to the ER, urgent care clinic since your last visit? Hospitalized since your last visit? No    2. Have you seen or consulted any other health care providers outside of the [address] [place] [address] since your last visit? Include any pap smears or colon screening. No    Patient was given a copy of the Advanced Directive and understands to bring it in once completed.

## 2019-09-24 PROBLEM — R22.42 HIP MASS, LEFT: Status: RESOLVED | Noted: 2019-08-11 | Resolved: 2019-09-24

## 2019-10-31 DIAGNOSIS — I10 ESSENTIAL HYPERTENSION: Primary | ICD-10-CM

## 2019-10-31 RX ORDER — LOSARTAN POTASSIUM 100 MG/1
100 TABLET ORAL DAILY
Qty: 90 TAB | Refills: 1 | Status: SHIPPED | OUTPATIENT
Start: 2019-10-31 | End: 2020-10-01

## 2019-10-31 RX ORDER — HYDROCHLOROTHIAZIDE 25 MG/1
25 TABLET ORAL DAILY
Qty: 90 TAB | Refills: 1 | Status: SHIPPED | OUTPATIENT
Start: 2019-10-31 | End: 2020-10-01

## 2019-10-31 NOTE — TELEPHONE ENCOUNTER
Spoke with patients pharmacy, no auth needed for the losartan/hct 100-25 mg. The medication needs to changed because its on back order. Pharmacist suggesting splitting the medication separately. Unable to leave a voicemail for the patient about medication change. Will try to reach the patient at a later time.

## 2019-10-31 NOTE — TELEPHONE ENCOUNTER
Pt sates his pharmacy [facility] [address] has told him that Losartan Hydroclorothiazide needs prior auth because it is too expensive? ?? He said he doesn't know if he ever picked up any pills that were filled in September. He said [facility] told him they have sent requests to us. I don't see any communication in record about this. Pt wants nurse to call [facility] [address] and either have a new med prescribed or whatever needs to happen, I don't think he knows what medicine he is taking.

## 2019-12-10 ENCOUNTER — TELEPHONE (OUTPATIENT)
Dept: ORTHOPEDIC SURGERY | Age: 59
End: 2019-12-10

## 2019-12-10 NOTE — TELEPHONE ENCOUNTER
Patient called in to let me know what PM facility he had chosen. He wants to go to [facility] for treatment and asked if he would get in before the holidays? I told him that I had at least 20 patients to process before doing his and that then Dr. Wallace Jules office would review his chart and call him with an appointment if accepted. I doubted if he would get an appointment before the end of year.

## 2020-02-20 ENCOUNTER — OFFICE VISIT (OUTPATIENT)
Dept: INTERNAL MEDICINE CLINIC | Age: 60
End: 2020-02-20

## 2020-02-20 VITALS
SYSTOLIC BLOOD PRESSURE: 126 MMHG | HEIGHT: 73 IN | RESPIRATION RATE: 16 BRPM | TEMPERATURE: 97.6 F | OXYGEN SATURATION: 98 % | HEART RATE: 73 BPM | DIASTOLIC BLOOD PRESSURE: 90 MMHG | BODY MASS INDEX: 29.29 KG/M2 | WEIGHT: 221 LBS

## 2020-02-20 DIAGNOSIS — I10 ESSENTIAL HYPERTENSION: Primary | ICD-10-CM

## 2020-02-20 DIAGNOSIS — M51.9 LUMBAR DISC DISEASE: ICD-10-CM

## 2020-02-20 DIAGNOSIS — Z11.59 NEED FOR HEPATITIS C SCREENING TEST: ICD-10-CM

## 2020-02-20 DIAGNOSIS — Z12.11 SCREENING FOR COLON CANCER: ICD-10-CM

## 2020-02-20 DIAGNOSIS — R29.898 WEAKNESS OF BOTH ARMS: ICD-10-CM

## 2020-02-20 NOTE — PROGRESS NOTES
INTERNISTS OF [facility]:  2/20/2020, MRN: [id number]      Swapna Carvalho is a 61 y.o. male and presents to clinic for Hypertension (follow up  ROOMM  12)    Subjective: The patient is a 59-year-old male with history of lumbar disc disease (followed by ) and hypertension. 1. BUE weakness: \"My arms are falling asleep. \"  No neck pain. Sx are off/on. He is asymptomatic recently. 2. HTN: BP is 126/90. Taking HCTZ and losartan. No adverse side effects from these rx. 3. Chronic Pain: He is scheduled to see  next month for chronic lower back pain. Lower back pain is worsening. Pain is worse with lying flat to sleep at night. He is modifying his activity level after his back pain has worsened recently. He admits to \"over-doing\" things -lifting heavy objects over the past 2 wks. +H/o lumbar disc disease. Patient Active Problem List    Diagnosis Date Noted    Lumbar disc disease 08/11/2019    Essential hypertension 08/11/2019       Current Outpatient Medications   Medication Sig Dispense Refill    losartan (COZAAR) 100 mg tablet Take 1 Tab by mouth daily. 90 Tab 1    hydroCHLOROthiazide (HYDRODIURIL) 25 mg tablet Take 1 Tab by mouth daily. 90 Tab 1    lidocaine (XYLOCAINE) 5 % ointment       diclofenac EC (VOLTAREN) 75 mg EC tablet Take 1 tab po BID with food as needed for pain 60 Tab 2       Allergies   Allergen Reactions    Gabapentin Other (comments)     Fatigue.  Cymbalta [Duloxetine] Other (comments)     Drowsiness. Fatigue       Past Medical History:   Diagnosis Date    Hypertension        History reviewed. No pertinent surgical history.     Family History   Problem Relation Age of Onset    Hypertension Mother     Cancer Father         Throat    Hypertension Father     Cancer Maternal Grandmother     Cancer Maternal Grandfather     Heart Disease Paternal Grandfather        Social History     Tobacco Use    Smoking status: Never Smoker    Smokeless tobacco: Current User     Types: Snuff   Substance Use Topics    Alcohol use: Yes     Frequency: Never     Comment: rare       ROS   Review of Systems   Constitutional: Negative for chills and fever. HENT: Negative for ear pain and sore throat. Eyes: Negative for blurred vision and pain. Respiratory: Negative for cough and shortness of breath. Cardiovascular: Negative for chest pain. Gastrointestinal: Negative for abdominal pain, blood in stool and melena. Genitourinary: Negative for dysuria and hematuria. Musculoskeletal: Positive for back pain and joint pain. Negative for myalgias. Skin: Negative for rash. Neurological: Positive for tingling (off/on along his left leg) and focal weakness (see HPI). Negative for headaches. Endo/Heme/Allergies: Does not bruise/bleed easily. Psychiatric/Behavioral: Negative for substance abuse. Objective     Vitals:    02/20/20 1104   BP: 126/90   Pulse: 73   Resp: 16   Temp: 97.6 °F (36.4 °C)   TempSrc: Oral   SpO2: 98%   Weight: 221 lb (100.2 kg)   Height: 6' 1\" (1.854 m)   PainSc:   8   PainLoc: Back       Physical Exam  Vitals signs reviewed. Constitutional:       Appearance: Normal appearance. HENT:      Head: Normocephalic and atraumatic. Right Ear: External ear normal.      Left Ear: External ear normal.      Nose: Nose normal.      Mouth/Throat:      Pharynx: No oropharyngeal exudate or posterior oropharyngeal erythema. Eyes:      Extraocular Movements: Extraocular movements intact. Conjunctiva/sclera: Conjunctivae normal.   Neck:      Musculoskeletal: Neck supple. Cardiovascular:      Rate and Rhythm: Normal rate and regular rhythm. Pulses: Normal pulses. Heart sounds: Normal heart sounds. Pulmonary:      Effort: Pulmonary effort is normal.      Breath sounds: Normal breath sounds. Abdominal:      General: Abdomen is flat. Bowel sounds are normal. There is no distension. Palpations: Abdomen is soft.    Musculoskeletal: General: No swelling (Bue) or tenderness (Bue). Comments: Lumbar spinous processes are TTP   Lymphadenopathy:      Cervical: No cervical adenopathy. Skin:     General: Skin is warm and dry. Findings: No erythema. Neurological:      Mental Status: He is alert and oriented to person, place, and time. Gait: Gait normal.   Psychiatric:         Mood and Affect: Mood normal.         LABS   Data Review:   Lab Results   Component Value Date/Time    WBC 7.7 02/25/2019 03:35 PM    HGB 14.5 02/25/2019 03:35 PM    HCT 44.9 02/25/2019 03:35 PM    PLATELET 388 23/65/2627 03:35 PM    MCV 84.9 02/25/2019 03:35 PM       Lab Results   Component Value Date/Time    Sodium 140 02/25/2019 03:35 PM    Potassium 4.0 02/25/2019 03:35 PM    Chloride 105 02/25/2019 03:35 PM    CO2 27 02/25/2019 03:35 PM    Anion gap 8 02/25/2019 03:35 PM    Glucose 92 02/25/2019 03:35 PM    BUN 15 02/25/2019 03:35 PM    Creatinine 1.14 02/25/2019 03:35 PM    BUN/Creatinine ratio 13 02/25/2019 03:35 PM    GFR est AA >60 02/25/2019 03:35 PM    GFR est non-AA >60 02/25/2019 03:35 PM    Calcium 8.9 02/25/2019 03:35 PM       No results found for: CHOL, CHOLPOCT, CHOLX, CHLST, CHOLV, HDL, HDLPOC, HDLP, LDL, LDLCPOC, LDLC, DLDLP, VLDLC, VLDL, TGLX, TRIGL, TRIGP, TGLPOCT, CHHD, CHHDX    No results found for: HBA1C, HGBE8, HTP8XHTF, YTX6TJWX    Assessment/Plan:   1. Essential hypertension: Stable. Continue with Rx as prescribed. [facility] labs. ORDERS:  - METABOLIC PANEL, COMPREHENSIVE; Future  - LIPID PANEL; Future  - MICROALBUMIN, UR, RAND W/ MICROALB/CREAT RATIO; Future  - MICROALBUMIN, UR, RAND W/ MICROALB/CREAT RATIO  - LIPID PANEL  - METABOLIC PANEL, COMPREHENSIVE    2. Lumbar disc disease: +Chronic pain. I encouraged him to modify his activity level. Activity as tolerated.  I encouraged him to follow-up with .     3. Health Maintenance:  Screening for hepatitis C.  Ordering a Cologuard to screen for colon cancer. ORDERS:  - HEPATITIS C AB; Future  - COLOGUARD TEST (FECAL DNA COLORECTAL CANCER SCREENING)    4. BUE Weakness: Etiology is unknown. We discussed getting EMG testing. He was a little off on this option at this time. Health Maintenance Due   Topic Date Due    Hepatitis C Screening  1960    DTaP/Tdap/Td series (1 - Tdap) 01/27/1971    Lipid Screen  01/27/2000    Shingrix Vaccine Age 50> (1 of 2) 01/27/2010    FOBT Q1Y Age 50-75  01/27/2010     Lab review: labs are reviewed in the EHR and ordered as mentioned above    I have discussed the diagnosis with the patient and the intended plan as seen in the above orders. The patient has received an after-visit summary and questions were answered concerning future plans. I have discussed medication side effects and warnings with the patient as well. I have reviewed the plan of care with the patient, accepted their input and they are in agreement with the treatment goals. All questions were answered. The patient understands the plan of care. Handouts provided today with above information. Pt instructed if symptoms worsen to call the office or report to the ED for continued care. Greater than 50% of the visit time was spent in counseling and/or coordination of care. Voice recognition was used to generate this report, which may have resulted in some phonetic based errors in grammar and contents. Even though attempts were made to correct all the mistakes, some may have been missed, and remained in the body of the document.           Sanchez Medrano MD

## 2020-02-20 NOTE — PATIENT INSTRUCTIONS
Health Maintenance Due Topic Date Due  
 Hepatitis C Screening  1960  
 DTaP/Tdap/Td series (1 - Tdap) 01/27/1971  Lipid Screen  01/27/2000  Shingrix Vaccine Age 50> (1 of 2) 01/27/2010  
 FOBT Q1Y Age 54-65  01/27/2010 Home Blood Pressure Test: About This Test 
What is it? A home blood pressure test allows you to keep track of your blood pressure at home. Blood pressure is a measure of the force of blood against the walls of your arteries. Blood pressure readings include two numbers, such as 130/80 (say \"130 over 80\"). The first number is the systolic pressure. The second number is the diastolic pressure. Why is this test done? You may do this test at home to: · Find out if you have high blood pressure. · Track your blood pressure if you have high blood pressure. · Track how well medicine is working to reduce high blood pressure. · Check how lifestyle changes, such as weight loss and exercise, are affecting blood pressure. How can you prepare for the test? 
· Do not use caffeine, tobacco, or medicines known to raise blood pressure (such as nasal decongestant sprays) for at least 30 minutes before taking your blood pressure. · Do not exercise for at least 30 minutes before taking your blood pressure. What happens before the test? 
Take your blood pressure while you feel comfortable and relaxed. Sit quietly with both feet on the floor for at least 5 minutes before the test. 
What happens during the test? 
· Sit with your arm slightly bent and resting on a table so that your upper arm is at the same level as your heart. · Roll up your sleeve or take off your shirt to expose your upper arm. · Wrap the blood pressure cuff around your upper arm so that the lower edge of the cuff is about 1 inch above the bend of your elbow. Proceed with the following steps depending on if you are using an automatic or manual pressure monitor. Automatic blood pressure monitors · Press the on/off button on the automatic monitor and wait until the ready-to-measure \"heart\" symbol appears next to zero in the display window. · Press the start button. The cuff will inflate and deflate by itself. · Your blood pressure numbers will appear on the screen. · Write your numbers in your log book, along with the date and time. Manual blood pressure monitors · Place the earpieces of a stethoscope in your ears, and place the bell of the stethoscope over the artery, just below the cuff. · Close the valve on the rubber inflating bulb. · Squeeze the bulb rapidly with your opposite hand to inflate the cuff until the dial or column of mercury reads about 30 mm Hg higher than your usual systolic pressure. If you do not know your usual pressure, inflate the cuff to 210 mm Hg or until the pulse at your wrist disappears. · Open the pressure valve just slightly by twisting or pressing the valve on the bulb. · As you watch the pressure slowly fall, note the level on the dial at which you first start to hear a pulsing or tapping sound through the stethoscope. This is your systolic blood pressure. · Continue letting the air out slowly. The sounds will become muffled and will finally disappear. Note the pressure when the sounds completely disappear. This is your diastolic blood pressure. Let out all the remaining air. · Write your numbers in your log book, along with the date and time. What else should you know about the test? 
It is more accurate to take the average of several readings made throughout the day than to rely on a single reading. It's normal for blood pressure to go up and down throughout the day. Follow-up care is a key part of your treatment and safety. Be sure to make and go to all appointments, and call your doctor if you are having problems. It's also a good idea to keep a list of the medicines you take. Where can you learn more? Go to http://gasper-regina.info/. Enter C427 in the search box to learn more about \"Home Blood Pressure Test: About This Test.\" Current as of: April 9, 2019 Content Version: 12.2 © 2022-5279 [employer]. Care instructions adapted under license by [employer] (which disclaims liability or warranty for this information). If you have questions about a medical condition or this instruction, always ask your healthcare professional. [place] [address] any warranty or liability for your use of this information.

## 2020-02-20 NOTE — PROGRESS NOTES
Veronica Catjana. presents today for   Chief Complaint   Patient presents with    Hypertension     follow up  ROOMM  12       Is someone accompanying this pt? no    Is the patient using any DME equipment during OV? no    Depression Screening:  3 most recent PHQ Screens 2/20/2020   Little interest or pleasure in doing things Not at all   Feeling down, depressed, irritable, or hopeless Not at all   Total Score PHQ 2 0       Learning Assessment:  Learning Assessment 2/20/2020   PRIMARY LEARNER Patient   HIGHEST LEVEL OF EDUCATION - PRIMARY LEARNER  GRADUATED HIGH SCHOOL OR GED   BARRIERS PRIMARY LEARNER NONE   CO-LEARNER CAREGIVER No   PRIMARY LANGUAGE ENGLISH   LEARNER PREFERENCE PRIMARY DEMONSTRATION   ANSWERED BY patient   RELATIONSHIP SELF       Abuse Screening:  Abuse Screening Questionnaire 2/20/2020   Do you ever feel afraid of your partner? N   Are you in a relationship with someone who physically or mentally threatens you? N   Is it safe for you to go home? Y       Fall Risk  No flowsheet data found. Health Maintenance reviewed and discussed and ordered per Provider. Health Maintenance Due   Topic Date Due    Hepatitis C Screening  1960    DTaP/Tdap/Td series (1 - Tdap) 01/27/1971    Lipid Screen  01/27/2000    Shingrix Vaccine Age 50> (1 of 2) 01/27/2010    FOBT Q1Y Age 50-75  01/27/2010         Coordination of Care:  1. Have you been to the ER, urgent care clinic since your last visit? Hospitalized since your last visit? no    2. Have you seen or consulted any other health care providers outside of the [address] [place] [address] since your last visit? Include any pap smears or colon screening.  no

## 2020-02-21 LAB
ALBUMIN SERPL-MCNC: 4.6 G/DL (ref 3.8–4.9)
ALBUMIN/CREAT UR: 8 MG/G CREAT (ref 0–29)
ALBUMIN/GLOB SERPL: 1.8 {RATIO} (ref 1.2–2.2)
ALP SERPL-CCNC: 48 IU/L (ref 39–117)
ALT SERPL-CCNC: 18 IU/L (ref 0–44)
AST SERPL-CCNC: 14 IU/L (ref 0–40)
BILIRUB SERPL-MCNC: 0.3 MG/DL (ref 0–1.2)
BUN SERPL-MCNC: 14 MG/DL (ref 8–27)
BUN/CREAT SERPL: 11 (ref 10–24)
CALCIUM SERPL-MCNC: 10 MG/DL (ref 8.6–10.2)
CHLORIDE SERPL-SCNC: 101 MMOL/L (ref 96–106)
CHOLEST SERPL-MCNC: 257 MG/DL (ref 100–199)
CO2 SERPL-SCNC: 25 MMOL/L (ref 20–29)
CREAT SERPL-MCNC: 1.22 MG/DL (ref 0.76–1.27)
CREAT UR-MCNC: 70.8 MG/DL
GLOBULIN SER CALC-MCNC: 2.6 G/DL (ref 1.5–4.5)
GLUCOSE SERPL-MCNC: 74 MG/DL (ref 65–99)
HCV AB S/CO SERPL IA: <0.1 S/CO RATIO (ref 0–0.9)
HDLC SERPL-MCNC: 43 MG/DL
INTERPRETATION, 910389: NORMAL
LDLC SERPL CALC-MCNC: 172 MG/DL (ref 0–99)
MICROALBUMIN UR-MCNC: 6 UG/ML
POTASSIUM SERPL-SCNC: 4.7 MMOL/L (ref 3.5–5.2)
PROT SERPL-MCNC: 7.2 G/DL (ref 6–8.5)
SODIUM SERPL-SCNC: 140 MMOL/L (ref 134–144)
TRIGL SERPL-MCNC: 212 MG/DL (ref 0–149)
VLDLC SERPL CALC-MCNC: 42 MG/DL (ref 5–40)

## 2020-02-24 ENCOUNTER — TELEPHONE (OUTPATIENT)
Dept: INTERNAL MEDICINE CLINIC | Age: 60
End: 2020-02-24

## 2020-02-24 DIAGNOSIS — E78.5 HYPERLIPIDEMIA, UNSPECIFIED HYPERLIPIDEMIA TYPE: Primary | ICD-10-CM

## 2020-02-24 PROBLEM — E78.2 MIXED HYPERLIPIDEMIA: Status: ACTIVE | Noted: 2020-02-24

## 2020-02-24 NOTE — TELEPHONE ENCOUNTER
Patient contacted, patient identified with two identifiers (Name & ). Patient aware of results per  and verbalizes understanding. Patient scheduled for lab visit 1 week prior to appointment in August. Patient requested diet info for cholesterol sent in the mail. I will send him heart healthy diet info by mail.

## 2020-02-24 NOTE — TELEPHONE ENCOUNTER
----- Message from Belen Renner MD sent at 2/24/2020  7:31 AM EST -----  Please let him know that his labs did not show any evidence of liver or kidney disease. He does not have hepatitis C. His cholesterol is very high. It is 257. His triglycerides are 212. His HDL is 43. His LDL is 172. Please schedule him for fasting labs 1 week before his follow-up appointment. If his numbers do not improve, he should be on cholesterol-lowering medication.     Dr. Debbie Turk  Internists of [address], [facility] [address]  Phone: [phone]  Fax: [phone]

## 2020-02-24 NOTE — PROGRESS NOTES
Please let him know that his labs did not show any evidence of liver or kidney disease. He does not have hepatitis C. His cholesterol is very high. It is 257. His triglycerides are 212. His HDL is 43. His LDL is 172. Please schedule him for fasting labs 1 week before his follow-up appointment. If his numbers do not improve, he should be on cholesterol-lowering medication.     Dr. Collette Mckeon  Internists of [facility], [address] [place] [address], [address] [place] [address] Str.  Phone: [phone]  Fax: [phone]

## 2020-10-01 DIAGNOSIS — I10 ESSENTIAL HYPERTENSION: ICD-10-CM

## 2020-10-01 DIAGNOSIS — E78.2 MIXED HYPERLIPIDEMIA: Primary | ICD-10-CM

## 2020-10-01 RX ORDER — LOSARTAN POTASSIUM AND HYDROCHLOROTHIAZIDE 25; 100 MG/1; MG/1
TABLET ORAL
Qty: 30 TAB | Refills: 5 | Status: SHIPPED | OUTPATIENT
Start: 2020-10-01 | End: 2021-03-21

## 2020-10-01 NOTE — TELEPHONE ENCOUNTER
Please schedule him for a follow up 30 min in office apt with me in March. Please schedule him for labs 1 wk before his f/u apt.     Dr. Denise Dubois  Internists of [facility], [address] [place] [address], [address] [place] [address] Str.  Phone: [phone]  Fax: [phone]

## 2021-03-03 ENCOUNTER — APPOINTMENT (OUTPATIENT)
Dept: INTERNAL MEDICINE CLINIC | Age: 61
End: 2021-03-03

## 2021-03-03 ENCOUNTER — HOSPITAL ENCOUNTER (OUTPATIENT)
Dept: LAB | Age: 61
Discharge: HOME | End: 2021-03-03

## 2021-03-03 LAB — XX-LABCORP SPECIMEN COL,LCBCF: NORMAL

## 2021-03-03 PROCEDURE — 99001 SPECIMEN HANDLING PT-LAB: CPT

## 2021-03-04 LAB
ALBUMIN SERPL-MCNC: 4.6 G/DL (ref 3.8–4.8)
ALBUMIN/CREAT UR: 11 MG/G CREAT (ref 0–29)
ALBUMIN/GLOB SERPL: 1.1 {RATIO} (ref 1.2–2.2)
ALP SERPL-CCNC: 62 IU/L (ref 39–117)
ALT SERPL-CCNC: 32 IU/L (ref 0–44)
AST SERPL-CCNC: 22 IU/L (ref 0–40)
BILIRUB SERPL-MCNC: 0.5 MG/DL (ref 0–1.2)
BUN SERPL-MCNC: 17 MG/DL (ref 8–27)
BUN/CREAT SERPL: 15 (ref 10–24)
CALCIUM SERPL-MCNC: 10.2 MG/DL (ref 8.6–10.2)
CHLORIDE SERPL-SCNC: 102 MMOL/L (ref 96–106)
CHOLEST SERPL-MCNC: 264 MG/DL (ref 100–199)
CO2 SERPL-SCNC: 22 MMOL/L (ref 20–29)
CREAT SERPL-MCNC: 1.16 MG/DL (ref 0.76–1.27)
CREAT UR-MCNC: 30.8 MG/DL
GLOBULIN SER CALC-MCNC: 4.2 G/DL (ref 1.5–4.5)
GLUCOSE SERPL-MCNC: 99 MG/DL (ref 65–99)
HDLC SERPL-MCNC: 50 MG/DL
IMP & REVIEW OF LAB RESULTS: NORMAL
LDLC SERPL CALC-MCNC: 174 MG/DL (ref 0–99)
MICROALBUMIN UR-MCNC: 3.3 UG/ML
POTASSIUM SERPL-SCNC: 4.8 MMOL/L (ref 3.5–5.2)
PROT SERPL-MCNC: 8.8 G/DL (ref 6–8.5)
SODIUM SERPL-SCNC: 140 MMOL/L (ref 134–144)
TRIGL SERPL-MCNC: 216 MG/DL (ref 0–149)
VLDLC SERPL CALC-MCNC: 40 MG/DL (ref 5–40)

## 2021-03-07 NOTE — TELEPHONE ENCOUNTER
I will discuss his results with him at his upcoming appointment. His CMP shows no significant abnormalities. Total cholesterol is elevated at 264, whereas it was 257 a year ago. Triglycerides are 260. HDL is 50. His LDL is 174. It was 172 a year ago. No microalbuminuria.      Dr. Bernard Body  Internists of [facility], [address] [place] [address], [address] [place] [address] Str.  Phone: [phone]  Fax: [phone]

## 2021-03-09 ENCOUNTER — VIRTUAL VISIT (OUTPATIENT)
Dept: INTERNAL MEDICINE CLINIC | Age: 61
End: 2021-03-09
Payer: MEDICAID

## 2021-03-09 DIAGNOSIS — E78.2 MIXED HYPERLIPIDEMIA: Primary | ICD-10-CM

## 2021-03-09 DIAGNOSIS — I10 ESSENTIAL HYPERTENSION: ICD-10-CM

## 2021-03-09 DIAGNOSIS — M51.9 LUMBAR DISC DISEASE: ICD-10-CM

## 2021-03-09 DIAGNOSIS — Z12.11 SCREENING FOR COLON CANCER: ICD-10-CM

## 2021-03-09 PROCEDURE — 99442 PR PHYS/QHP TELEPHONE EVALUATION 11-20 MIN: CPT | Performed by: INTERNAL MEDICINE

## 2021-03-09 RX ORDER — PRAVASTATIN SODIUM 40 MG/1
40 TABLET ORAL
Qty: 90 TAB | Refills: 3 | Status: SHIPPED | OUTPATIENT
Start: 2021-03-09 | End: 2022-01-18 | Stop reason: SDUPTHER

## 2021-03-09 RX ORDER — OXYCODONE HYDROCHLORIDE 5 MG/1
5 TABLET ORAL 3 TIMES DAILY
Qty: 90 TAB | Refills: 0
Start: 2021-03-09 | End: 2021-04-08

## 2021-03-09 NOTE — PROGRESS NOTES
Isela Dodge is a 64 y.o. male who was seen by synchronous (real-time) audio-phone only technology on 3/9/2021. Assessment & Plan:   1. Health Maintenance:  Ordering a Cologuard test for colon cancer screening. ORDERS:  - COLOGUARD TEST (FECAL DNA COLORECTAL CANCER SCREENING)    2. Essential hypertension: He is not checking his blood pressure  Continue with Rx as prescribed for now. Jose Booth will return to our office in June for an office BP check. 3. Mixed hyperlipidemia: LDL is elevated. Starting pravastatin. We will check his cholesterol in 3 months. ORDERS:  - pravastatin (PRAVACHOL) 40 mg tablet; Take 1 Tab by mouth nightly. Dispense: 90 Tab; Refill: 3  - LIPID PANEL; Future    4. Lumbar disc disease: This is the source of his chronic pain. I encouraged him to follow-up with  for continued medication management. Activity as tolerated. 5.  Tobacco Use:  I encouraged him to replace chewing tobacco with a healthier safer alternative behavior. Lab review: labs are reviewed in the EHR and ordered as mentioned above     I have discussed the diagnosis with the patient and the intended plan as seen in the above orders. I have discussed medication side effects and warnings with the patient as well. I have reviewed the plan of care with the patient, accepted their input and they are in agreement with the treatment goals. All questions were answered. The patient understands the plan of care. Pt instructed if symptoms worsen to call the office or report to the ED for continued care. Greater than 50% of the visit time was spent in counseling and/or coordination of care. I spent 17 minutes with patient this phone encounter     Voice recognition was used to generate this report, which may have resulted in some phonetic based errors in grammar and contents.  Even though attempts were made to correct all the mistakes, some may have been missed, and remained in the body of the document. Subjective:   Eros Pretty. was seen for   Chief Complaint   Patient presents with    Follow-up     The patient is a 64-year-old male with history of lumbar disc disease (followed by ) and hypertension. 1. Chronic Pain:  Followed by . He has chronic lower back pain. +H/o lumbar disc disease. He continues to have lower back pain. He is taking oxycodone 5mg tid. Morphine made him feel \"bad. \" Pain is 7/10. Radiation of pain: down his legs. Paresthesias: none. +Intolerance to gabapentin and cymbalta. He rarely consumes a beer. No drug use.     7/29/19 Lumbar Spine MRI: L5/S1 degenerative disc disease with loss of disc height, circumferential disc bulge. Encroachment on the right S1 nerve root in the lateral recess without definite impingement. Mild to moderate bilateral foraminal narrowing. L4/L5 disc protrusion with some contact of the right L5 nerve root in the lateral recess without impingement. Other degenerative changes without central stenosis or nerve root impingement as described. 2. HTN:  Taking: HCTZ and losartan. No adverse side effects from this rx. His Pain Management team checks his BP but he does not know his BP readings by memory. +Gained weight in between apts. His weight increased to 250lbs. No microalbuminuria. BP Readings from Last 3 Encounters:   02/20/20 126/90   09/20/19 (!) 145/91   09/12/19 (!) 170/100     3. HLD: LDL is 174. His total cholesterol is 264. He is not on a rx for HLD. He snacks a lot at night. 4. Tobacco Use: He \"dips\" every day. He has chewed tobacco for several years. 5. Health Maintenance:  - Colon cancer screening: Overdue      Prior to Admission medications    Medication Sig Start Date End Date Taking?  Authorizing Provider   losartan-hydroCHLOROthiazide (HYZAAR) 100-25 mg per tablet TAKE 1 TABLET BY MOUTH EVERY DAY 10/1/20   Marlin Chahal MD   lidocaine (XYLOCAINE) 5 % ointment  9/18/19   Provider, Historical diclofenac EC (VOLTAREN) 75 mg EC tablet Take 1 tab po BID with food as needed for pain 9/12/19   Salas Salas MD     Allergies   Allergen Reactions    Gabapentin Other (comments)     Fatigue.  Cymbalta [Duloxetine] Other (comments)     Drowsiness. Fatigue     Past Medical History:   Diagnosis Date    Hypertension      No past surgical history on file. Family History   Problem Relation Age of Onset    Hypertension Mother     Cancer Father         Throat    Hypertension Father     Cancer Maternal Grandmother     Cancer Maternal Grandfather     Heart Disease Paternal Grandfather      Social History     Socioeconomic History    Marital status:      Spouse name: Not on file    Number of children: Not on file    Years of education: Not on file    Highest education level: Not on file   Tobacco Use    Smoking status: Never Smoker    Smokeless tobacco: Current User     Types: Snuff   Substance and Sexual Activity    Alcohol use: Yes     Frequency: Never     Comment: rare    Drug use: Yes     Types: Marijuana    Sexual activity: Yes     Partners: Female       ROS:  Gen: No fever/chills  HEENT: No sore throat, eye pain, ear pain, or congestion. No HA  CV: No CP  Resp: No cough/SOB  GI: No abdominal pain  : No hematuria/dysuria  Derm: No rash  Neuro: No new paresthesias/weakness  Musc: No new myalgias/jt pain.  See HPI  Psych: No depression sx      LABS:  Lab Results   Component Value Date/Time    Sodium 140 03/03/2021 12:00 AM    Potassium 4.8 03/03/2021 12:00 AM    Chloride 102 03/03/2021 12:00 AM    CO2 22 03/03/2021 12:00 AM    Anion gap 8 02/25/2019 03:35 PM    Glucose 99 03/03/2021 12:00 AM    BUN 17 03/03/2021 12:00 AM    Creatinine 1.16 03/03/2021 12:00 AM    BUN/Creatinine ratio 15 03/03/2021 12:00 AM    GFR est AA 78 03/03/2021 12:00 AM    GFR est non-AA 68 03/03/2021 12:00 AM    Calcium 10.2 03/03/2021 12:00 AM       Lab Results   Component Value Date/Time    Cholesterol, total 264 (H) 03/03/2021 12:00 AM    HDL Cholesterol 50 03/03/2021 12:00 AM    LDL, calculated 174 (H) 03/03/2021 12:00 AM    LDL, calculated 172 (H) 02/20/2020 11:50 AM    VLDL, calculated 40 03/03/2021 12:00 AM    VLDL, calculated 42 (H) 02/20/2020 11:50 AM    Triglyceride 216 (H) 03/03/2021 12:00 AM       Lab Results   Component Value Date/Time    WBC 7.7 02/25/2019 03:35 PM    HGB 14.5 02/25/2019 03:35 PM    HCT 44.9 02/25/2019 03:35 PM    PLATELET 249 80/42/0698 03:35 PM    MCV 84.9 02/25/2019 03:35 PM       No results found for: HBA1C, HGBE8, KQS1ORPW, RLG4MMPK    No results found for: TSH, TSH2, TSH3, TSHP, TSHEXT        Due to this being a TeleHealth phone only evaluation, many elements of the physical examination are unable to be assessed. The pt was seen by synchronous (real-time) audio-phone only technology, and/or her healthcare decision maker, is aware that this patient-initiated, Telehealth encounter is a billable service, with coverage as determined by her insurance carrier. She is aware that she may receive a bill and has provided verbal consent to proceed: Yes. The pt is being evaluated by a phone only visit encounter for concerns as above. A caregiver was present when appropriate. Due to this being a TeleHealth encounter (During [employer] public [facility] emergency), evaluation of the following organ systems was limited: Vitals/Constitutional/EENT/Resp/CV/GI//MS/Neuro/Skin/Heme-Lymph-Imm. Pursuant to the emergency declaration under the [address] [place] [address], [address] [place] [address] authority and the [employer] Act, this Virtual phone only Visit was conducted, with patient's (and/or legal guardian's) consent, to reduce the patient's risk of exposure to COVID-19 and provide necessary medical care. Services were provided through a phone only  synchronous discussion virtually to substitute for in-person clinic visit.  Patient and provider were located at their individual homes. We discussed the expected course, resolution and complications of the diagnosis(es) in detail. Medication risks, benefits, costs, interactions, and alternatives were discussed as indicated. I advised him to contact the office if his condition worsens, changes or fails to improve as anticipated. He expressed understanding with the diagnosis(es) and plan.      Juan A Don MD

## 2021-03-20 LAB — COLOGUARD TEST, EXTERNAL: NEGATIVE

## 2021-03-23 ENCOUNTER — TELEPHONE (OUTPATIENT)
Dept: INTERNAL MEDICINE CLINIC | Age: 61
End: 2021-03-23

## 2021-06-02 ENCOUNTER — APPOINTMENT (OUTPATIENT)
Dept: INTERNAL MEDICINE CLINIC | Age: 61
End: 2021-06-02

## 2021-06-03 LAB
CHOLEST SERPL-MCNC: 180 MG/DL (ref 100–199)
HDLC SERPL-MCNC: 37 MG/DL
IMP & REVIEW OF LAB RESULTS: NORMAL
LDLC SERPL CALC-MCNC: 113 MG/DL (ref 0–99)
TRIGL SERPL-MCNC: 168 MG/DL (ref 0–149)
VLDLC SERPL CALC-MCNC: 30 MG/DL (ref 5–40)

## 2021-06-07 NOTE — PROGRESS NOTES
I will discuss his results with him at his upcoming appointment. His total cholesterol is down from 264 earlier this year to 180. His triglycerides are down from 216 to 168. His HDL is 37. His LDL is down from 174 to 113.     Dr. Vincent Woodard  Internists of [facility], [address] [place] [address], [address] [place] [address] Str.  Phone: [phone]  Fax: [phone]

## 2021-06-08 NOTE — PROGRESS NOTES
David Manriquez. presents today for   Chief Complaint   Patient presents with    Follow-up    Hypertension    Cholesterol Problem    Labs     6-2-21        Coordination of Care:  1. Have you been to the ER, urgent care clinic since your last visit? Hospitalized since your last visit? no    2. Have you seen or consulted any other health care providers outside of the [address] [place] [address] since your last visit? Include any pap smears or colon screening.  yes

## 2021-06-09 ENCOUNTER — OFFICE VISIT (OUTPATIENT)
Dept: INTERNAL MEDICINE CLINIC | Age: 61
End: 2021-06-09
Payer: MEDICAID

## 2021-06-09 VITALS
BODY MASS INDEX: 31.41 KG/M2 | OXYGEN SATURATION: 97 % | RESPIRATION RATE: 12 BRPM | DIASTOLIC BLOOD PRESSURE: 75 MMHG | WEIGHT: 237 LBS | HEART RATE: 74 BPM | SYSTOLIC BLOOD PRESSURE: 132 MMHG | HEIGHT: 73 IN | TEMPERATURE: 97.5 F

## 2021-06-09 DIAGNOSIS — F17.228 CHEWING TOBACCO NICOTINE DEPENDENCE WITH OTHER NICOTINE-INDUCED DISORDER: ICD-10-CM

## 2021-06-09 DIAGNOSIS — E78.2 MIXED HYPERLIPIDEMIA: ICD-10-CM

## 2021-06-09 DIAGNOSIS — E78.2 MIXED HYPERLIPIDEMIA: Primary | ICD-10-CM

## 2021-06-09 DIAGNOSIS — M51.9 LUMBAR DISC DISEASE: ICD-10-CM

## 2021-06-09 DIAGNOSIS — I10 ESSENTIAL HYPERTENSION: ICD-10-CM

## 2021-06-09 PROBLEM — F17.200 NICOTINE DEPENDENCE: Status: ACTIVE | Noted: 2021-06-09

## 2021-06-09 PROCEDURE — 99214 OFFICE O/P EST MOD 30 MIN: CPT | Performed by: INTERNAL MEDICINE

## 2021-06-09 RX ORDER — OXYCODONE HYDROCHLORIDE 5 MG/1
TABLET ORAL
COMMUNITY
Start: 2021-05-19 | End: 2022-07-18

## 2021-06-09 RX ORDER — ZOSTER VACCINE RECOMBINANT, ADJUVANTED 50 MCG/0.5
0.5 KIT INTRAMUSCULAR
Qty: 1 ML | Refills: 0 | Status: SHIPPED | OUTPATIENT
Start: 2021-06-09 | End: 2021-08-10

## 2021-06-09 NOTE — PROGRESS NOTES
[facility][note 2]:  6/9/2021, MRN: [id number]      Raz Perdomo is a 64 y.o. male and presents to clinic for Follow-up, Hypertension, Cholesterol Problem, and Labs (6-2-21)      Subjective: The patient is a 60 -year-old male with history of lumbar disc disease (followed by ), chewing tobacco, HLD, and hypertension.     1. HLD: He was started on pravastatin in between apts. Today he reports: no adverse side effects from the rx . Wt Readings from Last 3 Encounters:   06/09/21 237 lb (107.5 kg)   02/20/20 221 lb (100.2 kg)   09/20/19 216 lb 3.2 oz (98.1 kg)     2. HTN: Taking Hyzaar. VSS. 3. Chronic Lower Back Pain: Followed by . On percocet. +H/o lumbar disc disease. He was unable to tolerate morphine. Pain continues to radiate down his legs. No paresthesias. +H/o intolerance to gabapentin and cymbalta. He has worse sx with prolonged sitting/standing/walking.      7/29/19 Lumbar Spine MRI: L5/S1 degenerative disc disease with loss of disc height, circumferential disc bulge. Encroachment on the right S1 nerve root in the lateral recess without definite impingement. Mild to moderate bilateral foraminal narrowing. L4/L5 disc protrusion with some contact of the right L5 nerve root in the lateral recess without impingement. Other degenerative changes without central stenosis or nerve root impingement as described.     4. Chewing Tobacco: He's been chewing tobacco for yrs. He chews tobacco daily, chewing a can every 2-3 days. 5. Health Maintenance:  - S/p 2 doses of Moderna vaccine earlier this year  - He's a good recreational fisherman. Patient Active Problem List    Diagnosis Date Noted    Mixed hyperlipidemia 02/24/2020    Lumbar disc disease 08/11/2019    Essential hypertension 08/11/2019       Current Outpatient Medications   Medication Sig Dispense Refill    oxyCODONE IR (ROXICODONE) 5 mg immediate release tablet TAKE 1 TABLET 4 TIMES A DAY BY ORAL ROUTE FOR 30 DAYS.  losartan-hydroCHLOROthiazide (HYZAAR) 100-25 mg per tablet TAKE 1 TABLET BY MOUTH EVERY DAY 90 Tab 1    pravastatin (PRAVACHOL) 40 mg tablet Take 1 Tab by mouth nightly. 90 Tab 3       Allergies   Allergen Reactions    Gabapentin Other (comments)     Fatigue.  Cymbalta [Duloxetine] Other (comments)     Drowsiness. Fatigue       Past Medical History:   Diagnosis Date    Hypertension        No past surgical history on file. Family History   Problem Relation Age of Onset    Hypertension Mother     Cancer Father         Throat    Hypertension Father     Cancer Maternal Grandmother     Cancer Maternal Grandfather     Heart Disease Paternal Grandfather        Social History     Tobacco Use    Smoking status: Never Smoker    Smokeless tobacco: Current User     Types: Snuff   Substance Use Topics    Alcohol use: Yes     Comment: rare       ROS   Review of Systems   Constitutional: Negative for chills and fever. HENT: Negative for ear pain and sore throat. Eyes: Negative for blurred vision and pain. Respiratory: Negative for cough and shortness of breath. Cardiovascular: Negative for chest pain. Gastrointestinal: Negative for abdominal pain, blood in stool and melena. Genitourinary: Negative for dysuria and hematuria. Musculoskeletal: Positive for back pain. Negative for joint pain and myalgias. Skin: Negative for rash. Neurological: Positive for tingling. Negative for focal weakness and headaches. Endo/Heme/Allergies: Does not bruise/bleed easily. Psychiatric/Behavioral: Negative for substance abuse. Objective     Vitals:    06/09/21 1057   BP: 132/75   Pulse: 74   Resp: 12   Temp: 97.5 °F (36.4 °C)   TempSrc: Temporal   SpO2: 97%   Weight: 237 lb (107.5 kg)   Height: 6' 1\" (1.854 m)   PainSc:   8   PainLoc: Generalized       Physical Exam  Vitals and nursing note reviewed. Constitutional:       Appearance: Normal appearance.    HENT:      Head: Normocephalic and atraumatic. Right Ear: External ear normal.      Left Ear: External ear normal.   Eyes:      Conjunctiva/sclera: Conjunctivae normal.   Cardiovascular:      Rate and Rhythm: Normal rate and regular rhythm. Pulses: Normal pulses. Heart sounds: Normal heart sounds. Pulmonary:      Effort: Pulmonary effort is normal.      Breath sounds: Normal breath sounds. Abdominal:      General: Abdomen is flat. Bowel sounds are normal. There is no distension. Palpations: Abdomen is soft. Tenderness: There is no abdominal tenderness. Musculoskeletal:         General: No swelling or tenderness (BUE). Cervical back: Neck supple. Comments: His lumbar spinous processes are TTP. The remainder of his back exam is unremarkable. Lymphadenopathy:      Cervical: No cervical adenopathy. Skin:     General: Skin is warm and dry. Findings: No erythema. Neurological:      Mental Status: He is alert. Mental status is at baseline.       Gait: Gait normal.   Psychiatric:         Mood and Affect: Mood normal.         LABS   Data Review:   Lab Results   Component Value Date/Time    WBC 7.7 02/25/2019 03:35 PM    HGB 14.5 02/25/2019 03:35 PM    HCT 44.9 02/25/2019 03:35 PM    PLATELET 308 36/80/2792 03:35 PM    MCV 84.9 02/25/2019 03:35 PM       Lab Results   Component Value Date/Time    Sodium 140 03/03/2021 12:00 AM    Potassium 4.8 03/03/2021 12:00 AM    Chloride 102 03/03/2021 12:00 AM    CO2 22 03/03/2021 12:00 AM    Anion gap 8 02/25/2019 03:35 PM    Glucose 99 03/03/2021 12:00 AM    BUN 17 03/03/2021 12:00 AM    Creatinine 1.16 03/03/2021 12:00 AM    BUN/Creatinine ratio 15 03/03/2021 12:00 AM    GFR est AA 78 03/03/2021 12:00 AM    GFR est non-AA 68 03/03/2021 12:00 AM    Calcium 10.2 03/03/2021 12:00 AM       Lab Results   Component Value Date/Time    Cholesterol, total 180 06/02/2021 10:43 AM    HDL Cholesterol 37 (L) 06/02/2021 10:43 AM    LDL, calculated 113 (H) 06/02/2021 10:43 AM    LDL, calculated 172 (H) 02/20/2020 11:50 AM    VLDL, calculated 30 06/02/2021 10:43 AM    VLDL, calculated 42 (H) 02/20/2020 11:50 AM    Triglyceride 168 (H) 06/02/2021 10:43 AM       No results found for: HBA1C, [id number], [id number]    Assessment/Plan:   1. HLD: Stable. - C/w rx as prescribed. 2. Chewing Tobacco:  - I encouraged him to cut down on this behavior or to stop it altogether. He will continue working on this. 3. Chronic Lower Back Pain:  - I encouraged him to not overdue his activities. He should proceed with activity as tolerated. - C/w rx per . 4. HTN: Improved! - C/w rx as prescribed. - RTC in October for a BP check    5. Health Maintenance:  - I encouraged him to get the shingrix vaccine series. Health Maintenance Due   Topic Date Due    COVID-19 Vaccine (1) Never done    Shingrix Vaccine Age 50> (1 of 2) Never done     Lab review: labs are reviewed in the EHR     I have discussed the diagnosis with the patient and the intended plan as seen in the above orders. The patient has received an after-visit summary and questions were answered concerning future plans. I have discussed medication side effects and warnings with the patient as well. I have reviewed the plan of care with the patient, accepted their input and they are in agreement with the treatment goals. All questions were answered. The patient understands the plan of care. Handouts provided today with above information. Pt instructed if symptoms worsen to call the office or report to the ED for continued care. Greater than 50% of the visit time was spent in counseling and/or coordination of care. Voice recognition was used to generate this report, which may have resulted in some phonetic based errors in grammar and contents. Even though attempts were made to correct all the mistakes, some may have been missed, and remained in the body of the document.           Brody Jiménez MD

## 2021-06-09 NOTE — PATIENT INSTRUCTIONS

## 2021-10-12 DIAGNOSIS — I10 ESSENTIAL HYPERTENSION: ICD-10-CM

## 2021-10-12 RX ORDER — LOSARTAN POTASSIUM AND HYDROCHLOROTHIAZIDE 25; 100 MG/1; MG/1
1 TABLET ORAL DAILY
Qty: 90 TABLET | Refills: 1 | Status: SHIPPED | OUTPATIENT
Start: 2021-10-12 | End: 2022-01-18 | Stop reason: SDUPTHER

## 2021-10-12 NOTE — TELEPHONE ENCOUNTER
Please schedule him for an apt in office for a checkup before the end of the year.     Dr. Lindsey Tao  Internists of [facility], [facility], [address] [place] [address] Str.  Phone: [phone]  Fax: [phone]

## 2021-10-12 NOTE — TELEPHONE ENCOUNTER
Last Visit: 6/9/21 with MD Christ Lepe  Next Appointment: 10/11/21 pt cancelled appt  Previous Refill Encounter(s): 3/21/21 #90 with 1 refill    Requested Prescriptions     Pending Prescriptions Disp Refills    losartan-hydroCHLOROthiazide (HYZAAR) 100-25 mg per tablet 90 Tablet 1     Sig: Take 1 Tablet by mouth daily.

## 2021-12-22 ENCOUNTER — TRANSCRIBE ORDER (OUTPATIENT)
Dept: SCHEDULING | Age: 61
End: 2021-12-22

## 2021-12-22 DIAGNOSIS — M54.16 LUMBAR RADICULOPATHY: Primary | ICD-10-CM

## 2022-01-18 ENCOUNTER — OFFICE VISIT (OUTPATIENT)
Dept: INTERNAL MEDICINE CLINIC | Age: 62
End: 2022-01-18
Payer: MEDICAID

## 2022-01-18 VITALS
HEIGHT: 73 IN | TEMPERATURE: 98 F | BODY MASS INDEX: 31.81 KG/M2 | WEIGHT: 240 LBS | OXYGEN SATURATION: 97 % | DIASTOLIC BLOOD PRESSURE: 91 MMHG | HEART RATE: 72 BPM | RESPIRATION RATE: 14 BRPM | SYSTOLIC BLOOD PRESSURE: 141 MMHG

## 2022-01-18 DIAGNOSIS — I10 ESSENTIAL HYPERTENSION: Primary | ICD-10-CM

## 2022-01-18 DIAGNOSIS — E78.2 MIXED HYPERLIPIDEMIA: ICD-10-CM

## 2022-01-18 DIAGNOSIS — F17.228 CHEWING TOBACCO NICOTINE DEPENDENCE WITH OTHER NICOTINE-INDUCED DISORDER: ICD-10-CM

## 2022-01-18 DIAGNOSIS — I10 ESSENTIAL HYPERTENSION: ICD-10-CM

## 2022-01-18 DIAGNOSIS — M51.9 LUMBAR DISC DISEASE: ICD-10-CM

## 2022-01-18 PROCEDURE — 99214 OFFICE O/P EST MOD 30 MIN: CPT | Performed by: INTERNAL MEDICINE

## 2022-01-18 RX ORDER — PRAVASTATIN SODIUM 40 MG/1
40 TABLET ORAL
Qty: 90 TABLET | Refills: 3 | Status: SHIPPED | OUTPATIENT
Start: 2022-01-18

## 2022-01-18 RX ORDER — LOSARTAN POTASSIUM AND HYDROCHLOROTHIAZIDE 25; 100 MG/1; MG/1
1 TABLET ORAL DAILY
Qty: 90 TABLET | Refills: 2 | Status: SHIPPED | OUTPATIENT
Start: 2022-01-18 | End: 2022-03-09 | Stop reason: SDUPTHER

## 2022-01-18 NOTE — PROGRESS NOTES
[facility]:  1/18/2022, MRN: [id number]      Lyndon Agee is a 64 y.o. male and presents to clinic for Follow-up      Subjective: The patient is a 60 -year-old male with history of lumbar disc disease (followed by ), chewing tobacco, HLD, and hypertension.     1. HLD: On pravastatin. His last LDL was 113 last year. He is due for labs. His weight is increasing. 2.  Hypertension: On Hyzaar, with a blood pressure of 141/91 in our office. BMI is 31. His weight is up to 240 from 237 in June. He takes care of his 2yo grandson. No lightheadedness. 3.  Chronic Lower Back Pain:  Pain is 7/10. Followed by . Sitting in a car for long periods of time irritates his back. Riding on a bike is not as problematic though. +Ataxia. He rarely smokes marijuana. It helps with pain control. +H/o lumbar disc disease. He was unable to tolerate morphine in the past.  Pain in the past has radiate to his legs. He continues to deny paresthesias. He was also unable to tolerate gabapentin and Cymbalta. Lower back pain is presently treated with oxycodone. 7/29/19 Lumbar Spine MRI: L5/S1 degenerative disc disease with loss of disc height, circumferential disc bulge. Encroachment on the right S1 nerve root in the lateral recess without definite impingement. Mild to moderate bilateral foraminal narrowing. L4/L5 disc protrusion with some contact of the right L5 nerve root in the lateral recess without impingement. Other degenerative changes without central stenosis or nerve root impingement as described.     4. Tobacco Use: +Reported chewing tobacco at his last appointment. He slowed down on his chewing of tobacco products. A can of tobacco can last him 7-9 days. Note that at his last appointment, he reported that a can of tobacco would last him only 2 to 3 days. S/p 2 shots of moderna vaccine.       Patient Active Problem List    Diagnosis Date Noted    Nicotine dependence 06/09/2021    Mixed hyperlipidemia 02/24/2020    Lumbar disc disease 08/11/2019    Essential hypertension 08/11/2019       Current Outpatient Medications   Medication Sig Dispense Refill    losartan-hydroCHLOROthiazide (HYZAAR) 100-25 mg per tablet Take 1 Tablet by mouth daily. 90 Tablet 1    oxyCODONE IR (ROXICODONE) 5 mg immediate release tablet TAKE 1 TABLET 4 TIMES A DAY BY ORAL ROUTE FOR 30 DAYS.  pravastatin (PRAVACHOL) 40 mg tablet Take 1 Tab by mouth nightly. 90 Tab 3       Allergies   Allergen Reactions    Gabapentin Other (comments)     Fatigue.  Cymbalta [Duloxetine] Other (comments)     Drowsiness. Fatigue       Past Medical History:   Diagnosis Date    Hypertension        No past surgical history on file. Family History   Problem Relation Age of Onset    Hypertension Mother     Cancer Father         Throat    Hypertension Father     Cancer Maternal Grandmother     Cancer Maternal Grandfather     Heart Disease Paternal Grandfather        Social History     Tobacco Use    Smoking status: Never Smoker    Smokeless tobacco: Current User     Types: Snuff   Substance Use Topics    Alcohol use: Yes     Comment: rare       ROS   Review of Systems   Constitutional: Negative for chills and fever. HENT: Negative for ear pain and sore throat. Eyes: Negative for blurred vision and pain. Respiratory: Negative for cough and shortness of breath. Cardiovascular: Negative for chest pain. Gastrointestinal: Negative for abdominal pain, blood in stool and melena. Genitourinary: Negative for dysuria and hematuria. Musculoskeletal: Positive for back pain and myalgias. Negative for joint pain. Skin: Negative for rash. Neurological: Negative for headaches. Endo/Heme/Allergies: Does not bruise/bleed easily. Psychiatric/Behavioral: Negative for substance abuse.        Objective     Vitals:    01/18/22 1157 01/18/22 1201   BP: (!) 149/91 (!) 141/91   Pulse: 72    Resp: 14    Temp: 98 °F (36.7 °C) TempSrc: Temporal    SpO2: 97%    Weight: 240 lb (108.9 kg)    Height: 6' 1\" (1.854 m)    PainSc:   7    PainLoc: Generalized        Physical Exam  Vitals and nursing note reviewed. Constitutional:       Appearance: Normal appearance. HENT:      Head: Normocephalic and atraumatic. Right Ear: External ear normal.      Left Ear: External ear normal.   Eyes:      Extraocular Movements: Extraocular movements intact. Conjunctiva/sclera: Conjunctivae normal.   Cardiovascular:      Rate and Rhythm: Normal rate and regular rhythm. Pulses: Normal pulses. Heart sounds: Normal heart sounds. Pulmonary:      Effort: Pulmonary effort is normal.      Breath sounds: Normal breath sounds. Abdominal:      General: Abdomen is flat. Bowel sounds are normal. There is no distension. Palpations: Abdomen is soft. Tenderness: There is no abdominal tenderness. Musculoskeletal:         General: No swelling (BUE) or tenderness (BUE). Cervical back: Neck supple. Comments: Lumbar spinous processes are mildly TTP   Lymphadenopathy:      Cervical: No cervical adenopathy. Skin:     General: Skin is warm and dry. Neurological:      General: No focal deficit present. Mental Status: He is alert.       Gait: Gait normal.   Psychiatric:         Mood and Affect: Mood normal.         LABS   Data Review:   Lab Results   Component Value Date/Time    WBC 7.7 02/25/2019 03:35 PM    HGB 14.5 02/25/2019 03:35 PM    HCT 44.9 02/25/2019 03:35 PM    PLATELET 115 45/12/1696 03:35 PM    MCV 84.9 02/25/2019 03:35 PM       Lab Results   Component Value Date/Time    Sodium 140 03/03/2021 12:00 AM    Potassium 4.8 03/03/2021 12:00 AM    Chloride 102 03/03/2021 12:00 AM    CO2 22 03/03/2021 12:00 AM    Anion gap 8 02/25/2019 03:35 PM    Glucose 99 03/03/2021 12:00 AM    BUN 17 03/03/2021 12:00 AM    Creatinine 1.16 03/03/2021 12:00 AM    BUN/Creatinine ratio 15 03/03/2021 12:00 AM    GFR est AA 78 03/03/2021 12:00 AM    GFR est non-AA 68 03/03/2021 12:00 AM    Calcium 10.2 03/03/2021 12:00 AM       Lab Results   Component Value Date/Time    Cholesterol, total 180 06/02/2021 10:43 AM    HDL Cholesterol 37 (L) 06/02/2021 10:43 AM    LDL, calculated 113 (H) 06/02/2021 10:43 AM    LDL, calculated 172 (H) 02/20/2020 11:50 AM    VLDL, calculated 30 06/02/2021 10:43 AM    VLDL, calculated 42 (H) 02/20/2020 11:50 AM    Triglyceride 168 (H) 06/02/2021 10:43 AM       No results found for: HBA1C, [id number], [id number]    Assessment/Plan:   1. Essential hypertension: BP is borderline elevated. I discussed the importance of weight reduction. I am refilling his medication. I will have him return to our office in 6 months to reassess his blood pressure. If it is still elevated, I will need to add additional medication. We will check labs. ORDERS:  - METABOLIC PANEL, COMPREHENSIVE; Future  - LIPID PANEL; Future  - HEMOGLOBIN A1C WITH EAG; Future  - losartan-hydroCHLOROthiazide (HYZAAR) 100-25 mg per tablet; Take 1 Tablet by mouth daily. Dispense: 90 Tablet; Refill: 2    2. Mixed hyperlipidemia: He is gaining weight. I encouraged him to reduce his weight by maintaining a heart healthy diet as mentioned above. Refilling his medication, pravastatin. We will check labs today. ORDERS:  - METABOLIC PANEL, COMPREHENSIVE; Future  - LIPID PANEL; Future  - HEMOGLOBIN A1C WITH EAG; Future  - pravastatin (PRAVACHOL) 40 mg tablet; Take 1 Tablet by mouth nightly. Dispense: 90 Tablet; Refill: 3    3. Chewing tobacco nicotine dependence with other nicotine-induced disorder: He has cut back! I congratulated him on his ability to cut back on his use of chewing tobacco products. I encouraged him to continue cutting back and ultimately stopping this behavior. 4.Chronic Lower Back Pain: From lumbar disc disease.   I encouraged him to follow-up with  for continued medication management and treatment recommendations. Activity as tolerated      Health Maintenance Due   Topic Date Due    Shingrix Vaccine Age 49> (1 of 2) Never done    COVID-19 Vaccine (3 - Booster for Drake Patrick series) 11/11/2021         Lab review: labs are reviewed in the EHR and ordered as mentioned above    I have discussed the diagnosis with the patient and the intended plan as seen in the above orders. The patient has received an after-visit summary and questions were answered concerning future plans. I have discussed medication side effects and warnings with the patient as well. I have reviewed the plan of care with the patient, accepted their input and they are in agreement with the treatment goals. All questions were answered. The patient understands the plan of care. Handouts provided today with above information. Pt instructed if symptoms worsen to call the office or report to the ED for continued care. Greater than 50% of the visit time was spent in counseling and/or coordination of care. Voice recognition was used to generate this report, which may have resulted in some phonetic based errors in grammar and contents. Even though attempts were made to correct all the mistakes, some may have been missed, and remained in the body of the document.           Dennise Obrien MD

## 2022-01-18 NOTE — PATIENT INSTRUCTIONS
Eating Healthy Foods: Care Instructions  Your Care Instructions     Eating healthy foods can help lower your risk for disease. Healthy food gives you energy and keeps your heart strong, your brain active, your muscles working, and your bones strong. A healthy diet includes a variety of foods from the basic food groups: grains, vegetables, fruits, milk and milk products, and meat and beans. Some people may eat more of their favorite foods from only one food group and, as a result, miss getting the nutrients they need. So, it is important to pay attention not only to what you eat but also to what you are missing from your diet. You can eat a healthy, balanced diet by making a few small changes. Follow-up care is a key part of your treatment and safety. Be sure to make and go to all appointments, and call your doctor if you are having problems. It's also a good idea to know your test results and keep a list of the medicines you take. How can you care for yourself at home? Look at what you eat  · Keep a food diary for a week or two and record everything you eat or drink. Track the number of servings you eat from each food group. · For a balanced diet every day, eat a variety of:  ? 6 or more ounce-equivalents of grains, such as cereals, breads, crackers, rice, or pasta, every day. An ounce-equivalent is 1 slice of bread, 1 cup of ready-to-eat cereal, or ½ cup of cooked rice, cooked pasta, or cooked cereal.  ? 2½ cups of vegetables, especially:  § Dark-green vegetables such as broccoli and spinach. § Orange vegetables such as carrots and sweet potatoes. § Dry beans (such as montalvo and kidney beans) and peas (such as lentils). ? 2 cups of fresh, frozen, or canned fruit. A small apple or 1 banana or orange equals 1 cup. ? 3 cups of nonfat or low-fat milk, yogurt, or other milk products. ? 5½ ounces of meat and beans, such as chicken, fish, lean meat, beans, nuts, and seeds.  One egg, 1 tablespoon of peanut butter, ½ ounce nuts or seeds, or ¼ cup of cooked beans equals 1 ounce of meat. · Learn how to read food labels for serving sizes and ingredients. Fast-food and convenience-food meals often contain few or no fruits or vegetables. Make sure you eat some fruits and vegetables to make the meal more nutritious. · Look at your food diary. For each food group, add up what you have eaten and then divide the total by the number of days. This will give you an idea of how much you are eating from each food group. See if you can find some ways to change your diet to make it more healthy. Start small  · Do not try to make dramatic changes to your diet all at once. You might feel that you are missing out on your favorite foods and then be more likely to fail. · Start slowly, and gradually change your habits. Try some of the following:  ? Use whole wheat bread instead of white bread. ? Use nonfat or low-fat milk instead of whole milk. ? Eat brown rice instead of white rice, and eat whole wheat pasta instead of white-flour pasta. ? Try low-fat cheeses and low-fat yogurt. ? Add more fruits and vegetables to meals and have them for snacks. ? Add lettuce, tomato, cucumber, and onion to sandwiches. ? Add fruit to yogurt and cereal.  Enjoy food  · You can still eat your favorite foods. You just may need to eat less of them. If your favorite foods are high in fat, salt, and sugar, limit how often you eat them, but do not cut them out entirely. · Eat a wide variety of foods. Make healthy choices when eating out  · The type of restaurant you choose can help you make healthy choices. Even fast-food chains are now offering more low-fat or healthier choices on the menu. · Choose smaller portions, or take half of your meal home. · When eating out, try:  ? A veggie pizza with a whole wheat crust or grilled chicken (instead of sausage or pepperoni).   ? Pasta with roasted vegetables, grilled chicken, or marinara sauce instead of cream sauce. ? A vegetable wrap or grilled chicken wrap. ? Broiled or poached food instead of fried or breaded items. Make healthy choices easy  · Buy packaged, prewashed, ready-to-eat fresh vegetables and fruits, such as baby carrots, salad mixes, and chopped or shredded broccoli and cauliflower. · Buy packaged, presliced fruits, such as melon or pineapple. · Choose 100% fruit or vegetable juice instead of soda. Limit juice intake to 4 to 6 oz (½ to ¾ cup) a day. · Blend low-fat yogurt, fruit juice, and canned or frozen fruit to make a smoothie for breakfast or a snack. Where can you learn more? Go to http://www.amezquita.com/  Enter T756 in the search box to learn more about \"Eating Healthy Foods: Care Instructions. \"  Current as of: December 17, 2020               Content Version: 13.0  © 2006-2021 [employer]. Care instructions adapted under license by [employer] (which disclaims liability or warranty for this information). If you have questions about a medical condition or this instruction, always ask your healthcare professional. [place] [address] any warranty or liability for your use of this information. Low Sodium Diet (2,000 Milligram): Care Instructions  Overview     Limiting sodium can be an important part of managing some health problems. The most common source of sodium is salt. People get most of the salt in their diet from canned, prepared, and packaged foods. Fast food and restaurant meals also are very high in sodium. Your doctor will probably limit your sodium to less than 2,000 milligrams (mg) a day. This limit counts all the sodium in prepared and packaged foods and any salt you add to your food. Follow-up care is a key part of your treatment and safety. Be sure to make and go to all appointments, and call your doctor if you are having problems.  It's also a good idea to know your test results and keep a list of the medicines you take. How can you care for yourself at home? Read food labels  · Read labels on cans and food packages. The labels tell you how much sodium is in each serving. Make sure that you look at the serving size. If you eat more than the serving size, you have eaten more sodium. · Food labels also tell you the Percent Daily Value for sodium. Choose products with low Percent Daily Values for sodium. · Be aware that sodium can come in forms other than salt, including monosodium glutamate (MSG), sodium citrate, and sodium bicarbonate (baking soda). MSG is often added to Asian food. When you eat out, you can sometimes ask for food without MSG or added salt. Buy low-sodium foods  · Buy foods that are labeled \"unsalted\" (no salt added), \"sodium-free\" (less than 5 mg of sodium per serving), or \"low-sodium\" (140 mg or less of sodium per serving). Foods labeled \"reduced-sodium\" and \"light sodium\" may still have too much sodium. Be sure to read the label to see how much sodium you are getting. · Buy fresh vegetables, or frozen vegetables without added sauces. Buy low-sodium versions of canned vegetables, soups, and other canned goods. Prepare low-sodium meals  · Cut back on the amount of salt you use in cooking. This will help you adjust to the taste. Do not add salt after cooking. One teaspoon of salt has about 2,300 mg of sodium. · Take the salt shaker off the table. · Flavor your food with garlic, lemon juice, onion, vinegar, herbs, and spices. Do not use soy sauce, lite soy sauce, steak sauce, onion salt, garlic salt, celery salt, or ketchup on your food. · Use low-sodium salad dressings, sauces, and ketchup. Or make your own salad dressings and sauces without adding salt. · Use less salt (or none) when recipes call for it. You can often use half the salt a recipe calls for without losing flavor. Other foods such as rice, pasta, and grains do not need added salt.   · Rinse canned vegetables, and cook them in fresh water. This removes somebut not allof the salt. · Avoid water that is naturally high in sodium or that has been treated with water softeners, which add sodium. If you buy bottled water, read the label and choose a sodium-free brand. Avoid high-sodium foods  · Avoid eating:  ? Smoked, cured, salted, and canned meat, fish, and poultry. ? Ham, calhoun, hot dogs, and luncheon meats. ? Regular, hard, and processed cheese and regular peanut butter. ? Crackers with salted tops, and other salted snack foods such as pretzels, chips, and salted popcorn. ? Frozen prepared meals, unless labeled low-sodium. ? Canned and dried soups, broths, and bouillon, unless labeled sodium-free or low-sodium. ? Canned vegetables, unless labeled sodium-free or low-sodium. ? [place] fries, pizza, tacos, and other fast foods. ? Pickles, olives, ketchup, and other condiments, especially soy sauce, unless labeled sodium-free or low-sodium. Where can you learn more? Go to http://www.gray.com/  Enter V843 in the search box to learn more about \"Low Sodium Diet (2,000 Milligram): Care Instructions. \"  Current as of: December 17, 2020               Content Version: 13.0  © 5225-8228 [employer]. Care instructions adapted under license by [employer] (which disclaims liability or warranty for this information). If you have questions about a medical condition or this instruction, always ask your healthcare professional. [place] [address] any warranty or liability for your use of this information.

## 2022-01-25 ENCOUNTER — APPOINTMENT (OUTPATIENT)
Dept: INTERNAL MEDICINE CLINIC | Age: 62
End: 2022-01-25

## 2022-01-26 LAB
ALBUMIN SERPL-MCNC: 4.3 G/DL (ref 3.8–4.8)
ALBUMIN/GLOB SERPL: 1.5 {RATIO} (ref 1.2–2.2)
ALP SERPL-CCNC: 57 IU/L (ref 44–121)
ALT SERPL-CCNC: 21 IU/L (ref 0–44)
AST SERPL-CCNC: 17 IU/L (ref 0–40)
BILIRUB SERPL-MCNC: 0.6 MG/DL (ref 0–1.2)
BUN SERPL-MCNC: 14 MG/DL (ref 8–27)
BUN/CREAT SERPL: 10 (ref 10–24)
CALCIUM SERPL-MCNC: 9.5 MG/DL (ref 8.6–10.2)
CHLORIDE SERPL-SCNC: 99 MMOL/L (ref 96–106)
CHOLEST SERPL-MCNC: 171 MG/DL (ref 100–199)
CO2 SERPL-SCNC: 27 MMOL/L (ref 20–29)
CREAT SERPL-MCNC: 1.38 MG/DL (ref 0.76–1.27)
EST. AVERAGE GLUCOSE BLD GHB EST-MCNC: 120 MG/DL
GLOBULIN SER CALC-MCNC: 2.9 G/DL (ref 1.5–4.5)
GLUCOSE SERPL-MCNC: 102 MG/DL (ref 65–99)
HBA1C MFR BLD: 5.8 % (ref 4.8–5.6)
HDLC SERPL-MCNC: 34 MG/DL
IMP & REVIEW OF LAB RESULTS: NORMAL
INTERPRETATION: NORMAL
LDLC SERPL CALC-MCNC: 104 MG/DL (ref 0–99)
POTASSIUM SERPL-SCNC: 3.8 MMOL/L (ref 3.5–5.2)
PROT SERPL-MCNC: 7.2 G/DL (ref 6–8.5)
SODIUM SERPL-SCNC: 139 MMOL/L (ref 134–144)
TRIGL SERPL-MCNC: 190 MG/DL (ref 0–149)
VLDLC SERPL CALC-MCNC: 33 MG/DL (ref 5–40)

## 2022-01-27 NOTE — PROGRESS NOTES
Please schedule him for a follow-up visit, in office or virtual, to discuss his recent lab results. His total cholesterol is 171. Triglycerides are 190. LDL is 104, down from 113. HDL is 34, down from 37. His creatinine is 1.38 mildly elevated. His A1c test indicates that he has prediabetes.     Dr. Rashi Walton  [facility], [address] [place] [address], [address] [place] [address] Str.  Phone: [phone]  Fax: [phone]

## 2022-01-27 NOTE — TELEPHONE ENCOUNTER
----- Message from Cari Gao MD sent at 1/27/2022  4:03 AM EST -----  Please schedule him for a follow-up visit, in office or virtual, to discuss his recent lab results. His total cholesterol is 171. Triglycerides are 190. LDL is 104, down from 113. HDL is 34, down from 37. His creatinine is 1.38 mildly elevated. His A1c test indicates that he has prediabetes.     Dr. Esperanza Obrien  [facility], [address] [place] [address], [address] [place] [address] Str.  Phone: [phone]  Fax: [phone]

## 2022-01-28 NOTE — TELEPHONE ENCOUNTER
Patient aware of results and scheduled for a virtual visit next week to discuss. Patient states that the pharmacy is out of stock of the hyzaar 100-25 mg. They do have the losartan 100 mg and hydrochlorothiazide 25 mg separately in stock. Patient is willing to take it this way until they the other in stock. Pended for patient.

## 2022-01-30 RX ORDER — LOSARTAN POTASSIUM 100 MG/1
100 TABLET ORAL DAILY
Qty: 30 TABLET | Refills: 5 | Status: SHIPPED | OUTPATIENT
Start: 2022-01-30 | End: 2022-03-09

## 2022-01-30 RX ORDER — HYDROCHLOROTHIAZIDE 25 MG/1
25 TABLET ORAL DAILY
Qty: 30 TABLET | Refills: 5 | Status: SHIPPED | OUTPATIENT
Start: 2022-01-30 | End: 2022-03-09

## 2022-02-02 ENCOUNTER — VIRTUAL VISIT (OUTPATIENT)
Dept: INTERNAL MEDICINE CLINIC | Age: 62
End: 2022-02-02
Payer: MEDICAID

## 2022-02-02 DIAGNOSIS — R73.03 PREDIABETES: Primary | ICD-10-CM

## 2022-02-02 DIAGNOSIS — N17.9 AKI (ACUTE KIDNEY INJURY) (HCC): ICD-10-CM

## 2022-02-02 DIAGNOSIS — E78.2 MIXED HYPERLIPIDEMIA: ICD-10-CM

## 2022-02-02 PROCEDURE — 99442 PR PHYS/QHP TELEPHONE EVALUATION 11-20 MIN: CPT | Performed by: INTERNAL MEDICINE

## 2022-02-02 NOTE — PROGRESS NOTES
Carlos Jacob is a 58 y.o. male who was seen by synchronous (real-time) audio-phone only technology on 2/2/2022. Assessment & Plan:   HLD, Prediabetes, and ANABEL: Per recent labs  I encouraged him to take his pravastatin as prescribed. We will check a repeat lipid panel in 5 to 6 months. I will check a repeat A1c in 5 to 6 months to assess his prediabetes. We discussed this new diagnosis of prediabetes. All questions were answered. I encouraged him to reduce his carbs by 50% by doing the one half diet. I encouraged him to exercise for 30 minutes/day. We will recheck a BMP. I encouraged him to hydrate with water well. Placing a referral to a nutritionist.      Lab review: labs are reviewed in the EHR and ordered as mentioned above. I have discussed the diagnosis with the patient and the intended plan as seen in the above orders. I have discussed medication side effects and warnings with the patient as well. I have reviewed the plan of care with the patient, accepted their input and they are in agreement with the treatment goals. All questions were answered. The patient understands the plan of care. Pt instructed if symptoms worsen to call the office or report to the ED for continued care. Greater than 50% of the visit time was spent in counseling and/or coordination of care. I spent 19 min with the pt for this phone only encounter. Voice recognition was used to generate this report, which may have resulted in some phonetic based errors in grammar and contents. Even though attempts were made to correct all the mistakes, some may have been missed, and remained in the body of the document. Subjective:   Carlos Jacob was seen for   Chief Complaint   Patient presents with    Abnormal Lab Results     The patient is a 58 -year-old male with history of lumbar disc disease (followed by ), chewing tobacco, HLD, prediabetes, and hypertension.     1. Prediabetes:  New diagnosis. He cut down on his soda intake. His A1C is 5.8. He occasionally drinks liquor - but very little. 2. HLD: He has been missing his pravastatin rx off/on. His LDL is 104, down from 113. 3. ANABEL: His creatinine was mildly elevated at 1.38. BUN was WNL though. No urinary sx. Prior to Admission medications    Medication Sig Start Date End Date Taking? Authorizing Provider   losartan (COZAAR) 100 mg tablet Take 1 Tablet by mouth daily. 1/30/22   Penny Fatima MD   hydroCHLOROthiazide (HYDRODIURIL) 25 mg tablet Take 1 Tablet by mouth daily. 1/30/22   Penny Fatima MD   losartan-hydroCHLOROthiazide [place]) 100-25 mg per tablet Take 1 Tablet by mouth daily. 1/18/22   Penny Fatima MD   pravastatin (PRAVACHOL) 40 mg tablet Take 1 Tablet by mouth nightly. 1/18/22   Penny Fatima MD   oxyCODONE IR (ROXICODONE) 5 mg immediate release tablet TAKE 1 TABLET 4 TIMES A DAY BY ORAL ROUTE FOR 30 DAYS. 5/19/21   Provider, Historical     Allergies   Allergen Reactions    Gabapentin Other (comments)     Fatigue.  Cymbalta [Duloxetine] Other (comments)     Drowsiness. Fatigue     Past Medical History:   Diagnosis Date    Hypertension      No past surgical history on file. Family History   Problem Relation Age of Onset    Hypertension Mother     Cancer Father         Throat    Hypertension Father     Cancer Maternal Grandmother     Cancer Maternal Grandfather     Heart Disease Paternal Grandfather      Social History     Socioeconomic History    Marital status:    Tobacco Use    Smoking status: Never Smoker    Smokeless tobacco: Current User     Types: Snuff   Substance and Sexual Activity    Alcohol use: Yes     Comment: rare    Drug use: Yes     Types: Marijuana    Sexual activity: Yes     Partners: Female       ROS:  Gen: No fever/chills  HEENT: No sore throat, eye pain, ear pain, or congestion.  No HA  CV: No CP  Resp: No cough/SOB  GI: No abdominal pain  : No hematuria/dysuria  Derm: No rash  Neuro: No new paresthesias/weakness  Musc: No new myalgias/jt pain  Psych: No depression sx    LABS:  Lab Results   Component Value Date/Time    Sodium 139 01/25/2022 11:32 AM    Potassium 3.8 01/25/2022 11:32 AM    Chloride 99 01/25/2022 11:32 AM    CO2 27 01/25/2022 11:32 AM    Anion gap 8 02/25/2019 03:35 PM    Glucose 102 (H) 01/25/2022 11:32 AM    BUN 14 01/25/2022 11:32 AM    Creatinine 1.38 (H) 01/25/2022 11:32 AM    BUN/Creatinine ratio 10 01/25/2022 11:32 AM    GFR est AA 63 01/25/2022 11:32 AM    GFR est non-AA 55 (L) 01/25/2022 11:32 AM    Calcium 9.5 01/25/2022 11:32 AM       Lab Results   Component Value Date/Time    Cholesterol, total 171 01/25/2022 11:32 AM    HDL Cholesterol 34 (L) 01/25/2022 11:32 AM    LDL, calculated 104 (H) 01/25/2022 11:32 AM    LDL, calculated 172 (H) 02/20/2020 11:50 AM    VLDL, calculated 33 01/25/2022 11:32 AM    VLDL, calculated 42 (H) 02/20/2020 11:50 AM    Triglyceride 190 (H) 01/25/2022 11:32 AM       Lab Results   Component Value Date/Time    WBC 7.7 02/25/2019 03:35 PM    HGB 14.5 02/25/2019 03:35 PM    HCT 44.9 02/25/2019 03:35 PM    PLATELET 849 29/87/5170 03:35 PM    MCV 84.9 02/25/2019 03:35 PM       Lab Results   Component Value Date/Time    Hemoglobin A1c 5.8 (H) 01/25/2022 11:32 AM       No results found for: TSH, TSH2, TSH3, TSHP, TSHEXT        Due to this being a TeleHealth phone only evaluation, many elements of the physical examination are unable to be assessed. The pt was seen by synchronous (real-time) audio-phone only technology, and/or her healthcare decision maker, is aware that this patient-initiated, Telehealth encounter is a billable service, with coverage as determined by her insurance carrier. She is aware that she may receive a bill and has provided verbal consent to proceed: Yes. The patient (or guardian if applicable) is aware that this is a billable service, which includes applicable copays.  This virtual visit was conducted with the patient's (and/or legal guardian's) consent. The pt is located in a state where the provider was licensed to provide care. The pt is being evaluated by a phone only visit encounter for concerns as above. A caregiver was present when appropriate. Due to this being a TeleHealth encounter (During GPSAL-85 public health emergency), evaluation of the following organ systems was limited: Vitals/Constitutional/EENT/Resp/CV/GI//MS/Neuro/Skin/Heme-Lymph-Imm. Pursuant to the emergency declaration under the [address] [place] [address], [address] [place] [address] authority and the [employer] Act, this Virtual phone only visit was conducted, with patient's (and/or legal guardian's) consent, to reduce the patient's risk of exposure to COVID-19 and provide necessary medical care. Services were provided through a phone only synchronous discussion virtually to substitute for in-person clinic visit. Patient and provider were located at their individual home/office respectively. We discussed the expected course, resolution and complications of the diagnosis(es) in detail. Medication risks, benefits, costs, interactions, and alternatives were discussed as indicated. I advised him to contact the office if his condition worsens, changes or fails to improve as anticipated. He expressed understanding with the diagnosis(es) and plan.      Emiliano David MD

## 2022-02-03 ENCOUNTER — TELEPHONE (OUTPATIENT)
Dept: INTERNAL MEDICINE CLINIC | Age: 62
End: 2022-02-03

## 2022-02-03 NOTE — TELEPHONE ENCOUNTER
----- Message from Yancy Moya MD sent at 2/2/2022  1:46 PM EST -----  Regarding: Lab apt needed  Please have him scheduled for labs 1 week before his follow-up appointment in July.     Thanks,  Dr. Jermain Humphreys  [facility] of [address] [place] [address], [address] [place] [address], [address] [place] [address] Str.  Phone: [phone]  Fax: [phone]

## 2022-02-09 NOTE — PROGRESS NOTES
Please let him know that his x-ray does not show any evidence of fracture. There are no abnormalities.     Dr. Tejinder Ann  [facility] of [facility], [address] [place] [address], [facility]  Phone: [phone]  Fax: [phone] Pt woke up with c/o \"feels like something is in my eye\".  Eye assessed.  Redness noted with some tearing.  Unable to locate anything in the eye.  Dr. Padilla notified with orders to let Dr. Beebe know so pt can be assessed.  Lorraine OLSEN notified.

## 2022-02-23 ENCOUNTER — HOSPITAL ENCOUNTER (OUTPATIENT)
Dept: NUTRITION | Age: 62
Discharge: HOME | End: 2022-02-23
Payer: MEDICAID

## 2022-02-23 PROCEDURE — 97802 MEDICAL NUTRITION INDIV IN: CPT

## 2022-03-08 ENCOUNTER — TELEPHONE (OUTPATIENT)
Dept: INTERNAL MEDICINE CLINIC | Age: 62
End: 2022-03-08

## 2022-03-08 DIAGNOSIS — I10 ESSENTIAL HYPERTENSION: ICD-10-CM

## 2022-03-08 NOTE — TELEPHONE ENCOUNTER
Per pharmacist at [address] [place] [address]; they currently have the medications and are not aware of a recall.

## 2022-03-09 RX ORDER — LOSARTAN POTASSIUM AND HYDROCHLOROTHIAZIDE 25; 100 MG/1; MG/1
1 TABLET ORAL DAILY
Qty: 90 TABLET | Refills: 1 | Status: SHIPPED | OUTPATIENT
Start: 2022-03-09 | End: 2022-08-31 | Stop reason: SDUPTHER

## 2022-03-09 NOTE — TELEPHONE ENCOUNTER
Rx sent as requested.     Dr. Citlali Adams  [facility], [address] [facility], [facility] [address]  Phone: [phone]  Fax: [phone]

## 2022-03-09 NOTE — TELEPHONE ENCOUNTER
Patient called back, he is going to check with his insurance to see what pharmacies he can get prescriptions sent to, they don't cover [facility]. He will call us back once he knows what other pharmacies he can use.

## 2022-03-09 NOTE — TELEPHONE ENCOUNTER
Pt calling back after speaking with his ins co, he found out he can use [facility]. Pt request 90 day [facility]

## 2022-03-19 PROBLEM — E78.2 MIXED HYPERLIPIDEMIA: Status: ACTIVE | Noted: 2020-02-24

## 2022-03-19 PROBLEM — M51.9 LUMBAR DISC DISEASE: Status: ACTIVE | Noted: 2019-08-11

## 2022-03-19 PROBLEM — F17.200 NICOTINE DEPENDENCE: Status: ACTIVE | Noted: 2021-06-09

## 2022-03-19 PROBLEM — I10 ESSENTIAL HYPERTENSION: Status: ACTIVE | Noted: 2019-08-11

## 2022-04-13 ENCOUNTER — HOSPITAL ENCOUNTER (OUTPATIENT)
Dept: NUTRITION | Age: 62
End: 2022-04-13

## 2022-04-21 ENCOUNTER — TELEPHONE (OUTPATIENT)
Dept: INTERNAL MEDICINE CLINIC | Age: 62
End: 2022-04-21

## 2022-04-21 NOTE — TELEPHONE ENCOUNTER
Irene with [address] wants status on blood pressure cuff order that was faxed here on 4/11/22.     Please advise her at [phone]

## 2022-04-26 NOTE — TELEPHONE ENCOUNTER
Madonna El with [facility], checking status on request below.     Please fax to [phone]

## 2022-05-02 DIAGNOSIS — N17.9 AKI (ACUTE KIDNEY INJURY) (HCC): ICD-10-CM

## 2022-05-02 DIAGNOSIS — E78.2 MIXED HYPERLIPIDEMIA: ICD-10-CM

## 2022-05-02 DIAGNOSIS — R73.03 PREDIABETES: ICD-10-CM

## 2022-07-11 ENCOUNTER — APPOINTMENT (OUTPATIENT)
Dept: INTERNAL MEDICINE CLINIC | Age: 62
End: 2022-07-11

## 2022-07-12 LAB
BUN SERPL-MCNC: 11 MG/DL (ref 8–27)
BUN/CREAT SERPL: 12 (ref 10–24)
CALCIUM SERPL-MCNC: 9.4 MG/DL (ref 8.6–10.2)
CHLORIDE SERPL-SCNC: 103 MMOL/L (ref 96–106)
CHOLEST SERPL-MCNC: 159 MG/DL (ref 100–199)
CO2 SERPL-SCNC: 18 MMOL/L (ref 20–29)
CREAT SERPL-MCNC: 0.91 MG/DL (ref 0.76–1.27)
EGFR: 95 ML/MIN/1.73
EST. AVERAGE GLUCOSE BLD GHB EST-MCNC: 120 MG/DL
GLUCOSE SERPL-MCNC: 82 MG/DL (ref 65–99)
HBA1C MFR BLD: 5.8 % (ref 4.8–5.6)
HDLC SERPL-MCNC: 39 MG/DL
IMP & REVIEW OF LAB RESULTS: NORMAL
LDLC SERPL CALC-MCNC: 97 MG/DL (ref 0–99)
POTASSIUM SERPL-SCNC: 3.9 MMOL/L (ref 3.5–5.2)
SODIUM SERPL-SCNC: 141 MMOL/L (ref 134–144)
TRIGL SERPL-MCNC: 126 MG/DL (ref 0–149)
VLDLC SERPL CALC-MCNC: 23 MG/DL (ref 5–40)

## 2022-07-18 ENCOUNTER — OFFICE VISIT (OUTPATIENT)
Dept: INTERNAL MEDICINE CLINIC | Age: 62
End: 2022-07-18
Payer: MEDICAID

## 2022-07-18 VITALS
BODY MASS INDEX: 29.03 KG/M2 | TEMPERATURE: 98.4 F | SYSTOLIC BLOOD PRESSURE: 144 MMHG | DIASTOLIC BLOOD PRESSURE: 86 MMHG | OXYGEN SATURATION: 99 % | HEIGHT: 73 IN | HEART RATE: 72 BPM | WEIGHT: 219 LBS | RESPIRATION RATE: 18 BRPM

## 2022-07-18 DIAGNOSIS — F12.90 CANNABIS USE, UNSPECIFIED, UNCOMPLICATED: ICD-10-CM

## 2022-07-18 DIAGNOSIS — M54.50 CHRONIC LOW BACK PAIN, UNSPECIFIED BACK PAIN LATERALITY, UNSPECIFIED WHETHER SCIATICA PRESENT: ICD-10-CM

## 2022-07-18 DIAGNOSIS — I10 ESSENTIAL HYPERTENSION: Primary | ICD-10-CM

## 2022-07-18 DIAGNOSIS — R73.03 PREDIABETES: ICD-10-CM

## 2022-07-18 DIAGNOSIS — R53.83 FATIGUE, UNSPECIFIED TYPE: ICD-10-CM

## 2022-07-18 DIAGNOSIS — G89.29 CHRONIC LOW BACK PAIN, UNSPECIFIED BACK PAIN LATERALITY, UNSPECIFIED WHETHER SCIATICA PRESENT: ICD-10-CM

## 2022-07-18 DIAGNOSIS — I10 ESSENTIAL HYPERTENSION: ICD-10-CM

## 2022-07-18 DIAGNOSIS — E78.2 MIXED HYPERLIPIDEMIA: ICD-10-CM

## 2022-07-18 PROCEDURE — 99214 OFFICE O/P EST MOD 30 MIN: CPT | Performed by: INTERNAL MEDICINE

## 2022-07-18 NOTE — PATIENT INSTRUCTIONS

## 2022-07-18 NOTE — PROGRESS NOTES
Kathy Grace. presents today for   Chief Complaint   Patient presents with   Jack Aparicio Follow-up    Labs     7-11-22         1. \"Have you been to the ER, urgent care clinic since your last visit? Hospitalized since your last visit? \" no    2. \"Have you seen or consulted any other health care providers outside of the [address] [place] [address] since your last visit? \" yes     3. For patients aged 39-70: Has the patient had a colonoscopy / FIT/ Cologuard? Yes - no Care Gap present      If the patient is female:    4. For patients aged 41-77: Has the patient had a mammogram within the past 2 years? NA - based on age or sex  See top three    5. For patients aged 21-65: Has the patient had a pap smear?  NA - based on age or sex

## 2022-07-18 NOTE — PROGRESS NOTES
[facility] OF [facility]:  7/18/2022, MRN: [id number]      Debbie Ann is a 58 y.o. male and presents to clinic for Follow-up and Labs (7-11-22)      Subjective: The patient is a 58-year-old male with history of lumbar disc disease (followed by ), chewing tobacco, HLD, prediabetes, and hypertension. 1. Cannabis Use: He smokes cannabis several days pr wk. He's been smoking since 1974. He does not smoke cigarettes. 2. HTN: BP is 144/86. He is taking hyzaar. Sometimes his home BPs are <140/90s. +Fatigue. He lost a lot of weight. He has been lowering his intake of unhealthy foods. 3. HLD: He takes pravastatin irregularly due to forgetting to take it at night. His LDL is about the same since January. 4. Chronic Pain: He is in less pain since stopping oxycodone earlier this month. He also reports he may have been developing an addiction to his rx. 5.  Prediabetes: His most recent labs show his A1c is unchanged at 5.8. His weight is down from 240lbs in January to 219lbs. Patient Active Problem List    Diagnosis Date Noted    Nicotine dependence 06/09/2021    Mixed hyperlipidemia 02/24/2020    Lumbar disc disease 08/11/2019    Essential hypertension 08/11/2019       Current Outpatient Medications   Medication Sig Dispense Refill    losartan-hydroCHLOROthiazide (HYZAAR) 100-25 mg per tablet Take 1 Tablet by mouth daily. 90 Tablet 1    pravastatin (PRAVACHOL) 40 mg tablet Take 1 Tablet by mouth nightly. 90 Tablet 3       Allergies   Allergen Reactions    Gabapentin Other (comments)     Fatigue. Cymbalta [Duloxetine] Other (comments)     Drowsiness. Fatigue       Past Medical History:   Diagnosis Date    Hypertension        No past surgical history on file.     Family History   Problem Relation Age of Onset    Hypertension Mother     Cancer Father         Throat    Hypertension Father     Cancer Maternal Grandmother     Cancer Maternal Grandfather     Heart Disease Paternal Grandfather        Social History     Tobacco Use    Smoking status: Never Smoker    Smokeless tobacco: Current User     Types: Snuff   Substance Use Topics    Alcohol use: Yes     Comment: rare       ROS   Review of Systems   Constitutional:  Positive for malaise/fatigue. Negative for chills and fever. HENT:  Negative for ear pain and sore throat. Eyes:  Negative for blurred vision and pain. Respiratory:  Negative for cough and shortness of breath. Cardiovascular:  Negative for chest pain. Gastrointestinal:  Negative for abdominal pain, blood in stool and melena. Genitourinary:  Negative for dysuria and hematuria. Musculoskeletal:  Positive for back pain (off/on - improved since stopping his oxycodone). Negative for joint pain and myalgias. Skin:  Negative for rash. Neurological:  Negative for headaches. Endo/Heme/Allergies:  Does not bruise/bleed easily. Psychiatric/Behavioral:  Positive for substance abuse (only cannabis). Objective     Vitals:    07/18/22 1048 07/18/22 1052   BP: (!) 144/87 (!) 144/86   Pulse: 72    Resp: 18    Temp: 98.4 °F (36.9 °C)    TempSrc: Temporal    SpO2: 99%    Weight: 219 lb (99.3 kg)    Height: 6' 1\" (1.854 m)    PainSc:   5    PainLoc: Generalized        Physical Exam  Vitals and nursing note reviewed. Constitutional:       Appearance: Normal appearance. HENT:      Head: Normocephalic and atraumatic. Right Ear: External ear normal.      Left Ear: External ear normal.   Eyes:      Extraocular Movements: Extraocular movements intact. Conjunctiva/sclera: Conjunctivae normal.   Cardiovascular:      Rate and Rhythm: Normal rate and regular rhythm. Pulses: Normal pulses. Heart sounds: Normal heart sounds. Pulmonary:      Effort: Pulmonary effort is normal.      Breath sounds: Normal breath sounds. Abdominal:      General: Abdomen is flat. Bowel sounds are normal. There is no distension. Palpations: Abdomen is soft. Tenderness: There is no abdominal tenderness. Musculoskeletal:         General: No swelling or tenderness (BUE). Cervical back: Neck supple. Comments: His lumbar spinous processes are TTP   Lymphadenopathy:      Cervical: No cervical adenopathy. Skin:     General: Skin is warm and dry. Findings: No erythema. Neurological:      Mental Status: He is alert. Mental status is at baseline. Gait: Gait normal.   Psychiatric:         Mood and Affect: Mood normal.         LABS   Data Review:   Lab Results   Component Value Date/Time    WBC 7.7 02/25/2019 03:35 PM    HGB 14.5 02/25/2019 03:35 PM    HCT 44.9 02/25/2019 03:35 PM    PLATELET 980 39/11/9002 03:35 PM    MCV 84.9 02/25/2019 03:35 PM       Lab Results   Component Value Date/Time    Sodium 141 07/11/2022 10:38 AM    Potassium 3.9 07/11/2022 10:38 AM    Chloride 103 07/11/2022 10:38 AM    CO2 18 (L) 07/11/2022 10:38 AM    Anion gap 8 02/25/2019 03:35 PM    Glucose 82 07/11/2022 10:38 AM    BUN 11 07/11/2022 10:38 AM    Creatinine 0.91 07/11/2022 10:38 AM    BUN/Creatinine ratio 12 07/11/2022 10:38 AM    GFR est AA 63 01/25/2022 11:32 AM    GFR est non-AA 55 (L) 01/25/2022 11:32 AM    Calcium 9.4 07/11/2022 10:38 AM       Lab Results   Component Value Date/Time    Cholesterol, total 159 07/11/2022 10:38 AM    HDL Cholesterol 39 (L) 07/11/2022 10:38 AM    LDL, calculated 97 07/11/2022 10:38 AM    LDL, calculated 172 (H) 02/20/2020 11:50 AM    VLDL, calculated 23 07/11/2022 10:38 AM    VLDL, calculated 42 (H) 02/20/2020 11:50 AM    Triglyceride 126 07/11/2022 10:38 AM       Lab Results   Component Value Date/Time    Hemoglobin A1c 5.8 (H) 07/11/2022 10:38 AM       Assessment/Plan:   1. Prediabetes: A1C is 5.8  - Low carb diet recommended. - Checking a follow up A1C in 6 months. 2. Chronic Pain: Improved since stopping narcotic rx. - Activity as tolerated.   - Ok to follow up with Orthopedics prn.    3. HTN: Home BPs are <140/90.   - C/w home BP checks  - C/w rx as prescribed. 4. HLD: LDL is 97. - C/w rx as prescribed. I encouraged him to take his pravastatin 40mg daily dose each day, not skipping doses. 5. Cannabis Use: Chronic.   - I discussed the risks associated with recreational cannabis. - Pt encouraged to pursue getting a carrying card for safety and better pain control for #2.     6. Fatigue: Etiology is unknown. +Cannabis. - Checking labs      Health Maintenance Due   Topic Date Due    DTaP/Tdap/Td series (1 - Tdap) Never done    Shingrix Vaccine Age 50> (1 of 2) Never done    COVID-19 Vaccine (3 - Booster for Pfizer series) 10/11/2021         Lab review: labs are reviewed in the EHR and ordered as mentioned above    I have discussed the diagnosis with the patient and the intended plan as seen in the above orders. The patient has received an after-visit summary and questions were answered concerning future plans. I have discussed medication side effects and warnings with the patient as well. I have reviewed the plan of care with the patient, accepted their input and they are in agreement with the treatment goals. All questions were answered. The patient understands the plan of care. Handouts provided today with above information. Pt instructed if symptoms worsen to call the office or report to the ED for continued care. Greater than 50% of the visit time was spent in counseling and/or coordination of care. Voice recognition was used to generate this report, which may have resulted in some phonetic based errors in grammar and contents. Even though attempts were made to correct all the mistakes, some may have been missed, and remained in the body of the document.           Manolo Canales MD

## 2022-07-25 ENCOUNTER — APPOINTMENT (OUTPATIENT)
Dept: INTERNAL MEDICINE CLINIC | Age: 62
End: 2022-07-25

## 2022-07-26 LAB
BASOPHILS # BLD AUTO: 0.1 X10E3/UL (ref 0–0.2)
BASOPHILS NFR BLD AUTO: 1 %
EOSINOPHIL # BLD AUTO: 0.1 X10E3/UL (ref 0–0.4)
EOSINOPHIL NFR BLD AUTO: 1 %
ERYTHROCYTE [DISTWIDTH] IN BLOOD BY AUTOMATED COUNT: 14.6 % (ref 11.6–15.4)
HCT VFR BLD AUTO: 45.2 % (ref 37.5–51)
HGB BLD-MCNC: 14.5 G/DL (ref 13–17.7)
IMM GRANULOCYTES # BLD AUTO: 0 X10E3/UL (ref 0–0.1)
IMM GRANULOCYTES NFR BLD AUTO: 0 %
LYMPHOCYTES # BLD AUTO: 2.3 X10E3/UL (ref 0.7–3.1)
LYMPHOCYTES NFR BLD AUTO: 33 %
MCH RBC QN AUTO: 27.1 PG (ref 26.6–33)
MCHC RBC AUTO-ENTMCNC: 32.1 G/DL (ref 31.5–35.7)
MCV RBC AUTO: 84 FL (ref 79–97)
MONOCYTES # BLD AUTO: 0.5 X10E3/UL (ref 0.1–0.9)
MONOCYTES NFR BLD AUTO: 8 %
NEUTROPHILS # BLD AUTO: 3.9 X10E3/UL (ref 1.4–7)
NEUTROPHILS NFR BLD AUTO: 57 %
PLATELET # BLD AUTO: 245 X10E3/UL (ref 150–450)
RBC # BLD AUTO: 5.36 X10E6/UL (ref 4.14–5.8)
T4 FREE SERPL-MCNC: 1.1 NG/DL (ref 0.82–1.77)
TSH SERPL DL<=0.005 MIU/L-ACNC: 3.08 UIU/ML (ref 0.45–4.5)
WBC # BLD AUTO: 6.9 X10E3/UL (ref 3.4–10.8)

## 2022-07-29 ENCOUNTER — TELEPHONE (OUTPATIENT)
Dept: INTERNAL MEDICINE CLINIC | Age: 62
End: 2022-07-29

## 2022-07-29 NOTE — TELEPHONE ENCOUNTER
----- Message from Jace Layne MD sent at 7/27/2022  4:50 PM EDT -----  Please let him know that his CBC and TFTs are normal.    Dr. Robertson Guard  [facility], [address] [place] [address], [facility] [address]  Phone: [phone]  Fax: [phone]

## 2022-08-02 NOTE — TELEPHONE ENCOUNTER
Patient contacted, patient identified with two identifiers (Name & ). Patient aware of results per [facility] and verbalizes understanding.

## 2022-08-08 NOTE — PROGRESS NOTES
[address] [place] [address]     Nutrition Assessment  Medical Nutrition Therapy   Outpatient Initial Evaluation         Patient Name: Basim Soria. : 1960   Treatment Diagnosis: Type 2 diabetes  Mixed Hyperlipidemia     Referral Source: Bautista Rios MD [facility]): 2022     Gender: male Age: 58 y.o. Ht: 72 in Wt: 238  lb  kg   BMI: 32 BMR   Male 1918 BMR Female      Past Medical History:  HTN       Pertinent Medications: HCTZ  Losartan     Biochemical Data:   Lab Results   Component Value Date/Time    Hemoglobin A1c 5.8 (H) 2022 11:32 AM     Lab Results   Component Value Date/Time    Sodium 139 2022 11:32 AM    Potassium 3.8 2022 11:32 AM    Chloride 99 2022 11:32 AM    CO2 27 2022 11:32 AM    Anion gap 8 2019 03:35 PM    Glucose 102 (H) 2022 11:32 AM    BUN 14 2022 11:32 AM    Creatinine 1.38 (H) 2022 11:32 AM    BUN/Creatinine ratio 10 2022 11:32 AM    GFR est AA 63 2022 11:32 AM    GFR est non-AA 55 (L) 2022 11:32 AM    Calcium 9.5 2022 11:32 AM    Bilirubin, total 0.6 2022 11:32 AM    Alk. phosphatase 57 2022 11:32 AM    Protein, total 7.2 2022 11:32 AM    Albumin 4.3 2022 11:32 AM    Globulin 4.0 2019 03:35 PM    A-G Ratio 1.5 2022 11:32 AM    ALT (SGPT) 21 2022 11:32 AM    AST (SGOT) 17 2022 11:32 AM     Lab Results   Component Value Date/Time    Cholesterol, total 171 2022 11:32 AM    HDL Cholesterol 34 (L) 2022 11:32 AM    LDL, calculated 104 (H) 2022 11:32 AM    LDL, calculated 172 (H) 2020 11:50 AM    VLDL, calculated 33 2022 11:32 AM    VLDL, calculated 42 (H) 2020 11:50 AM    Triglyceride 190 (H) 2022 11:32 AM     Lab Results   Component Value Date/Time    ALT (SGPT) 21 2022 11:32 AM    AST (SGOT) 17 2022 11:32 AM    Alk.  phosphatase 57 2022 11:32 AM Bilirubin, total 0.6 01/25/2022 11:32 AM     Lab Results   Component Value Date/Time    Creatinine 1.38 (H) 01/25/2022 11:32 AM     Lab Results   Component Value Date/Time    BUN 14 01/25/2022 11:32 AM     Lab Results   Component Value Date/Time    Microalb/Creat ratio (ug/mg creat.) 11 03/03/2021 12:00 AM        Assessment:   Patient is a 58year old male who visits RD for insight on how to lose weight and improve his Hgb A1c. He has already made changes and has lost 7# since his visit with his PCP. Patient's activity consists of ADL and caring for his 11year old grandson. Patient's sleeping habits consists of 6-8 hours of sleep in a dark room without a TV. Food & Nutrition: Patient eats 3 meals a day and some snacks. Breakfast may be Honey Nut Cheerios with milk or Oatmeal.    Lunch and Dinner may be a protein, vegetable and a starch  Snacks may be a few cookies. Alcohol consumption is very rare. Estimate Needs   Calories: 2400  Protein: 100 Carbs: 250 Fat: 70   Kcal/day  g/day  g/day  g/day                            Nutrition Diagnosis Altered nutrition related laboratory values related to diabetes as evidenced by Hgb A1c of 5.8. Overweight/obesity related to excessive energy intake as evidenced by a BMI of 31. Undesirable food choices as evidenced by patients food recall; patient eats too many higher carbohydrate foods for breakfast.     Excessive Carbohydrate intake related to food -and nutrition- knowledge deficit and food and nutrition compliance limitations as evidenced by Hyperglycemia (fasting BG > 126 mg/dL), and Hemoglobin A1c > 6%, and pt's food recall reveals high carbohydrate intake at meals and snacks. Nutrition Intervention &  Education: Encouraged pt to drink >64 ounces of only calorie free or very low calorie/carb beverages only. Educated pt on all carbohydrates found in foods and encouraged no more than 25-50 gm/meal and 20 gm/snack.   Patient has been educated on combining all carbohydrate containing food with a protein source and/or a vegetable source. Educated pt on the pathophysiology of Type II Diabetes and insulin resistance and the rationale for dietary modification and increased activity. Patient was educated on the importance of making lifestyle changes such as:  1. Eating off a smaller plate and drinking from smaller glasses. 2. Increasing activity. 3. Menu planning and tracking. Recommend My Fitness Pal for tracking. 4. The importance of eating breakfast.  5. Appropriate snacks. 6. Portion control. 7. The importance of good sleeping habits. 8. How to read a label. Patient was encouraged to do this before purchasing and consuming an item. Handouts Provided: [x]  Carbohydrates  [x]  Protein  [x]  Non-starchy Vegatbles  [x]  Food Label  [x]  Meal and Snack Ideas  [x]  Food Journals []  Diabetes  []  Cholesterol  []  Sodium  [x]  Gen Nutr Guidelines  []  SBGM Guidelines  []  Others:   Information Reviewed with: Patient and his girlfriend   Readiness to Change Stage:   []  Pre-contemplative    []  Contemplative  []  Preparation               [x]  Action                  []  Maintenance   Potential Barriers to Learning: []  Decline in memory    []  Language barrier   []  Other:  []  Emotional                  []  Limited mobility  []  Lack of motivation     [] Vision, hearing or cognitive impairment   Expected Compliance: Good     Nutritional Goal - To promote lifestyle changes to result in:    [x]  Weight loss  [x]  Improved diabetic control  []  Decreased cholesterol levels  []  Decreased blood pressure  []  Weight maintenance []  Preventing any interactions associated with food allergies  []  Adequate weight gain toward goal weight  []  Other:        Patient Goals:   Patient desires to lose weight and improve HGB A1c. Dietitian Signature:  Sebas Morgan RD Date: 02/23/2022   Follow-up: Scheduled:  04/13/2022  11:00 am Emergent/ED

## 2022-08-31 DIAGNOSIS — I10 ESSENTIAL HYPERTENSION: ICD-10-CM

## 2022-08-31 RX ORDER — LOSARTAN POTASSIUM AND HYDROCHLOROTHIAZIDE 25; 100 MG/1; MG/1
1 TABLET ORAL DAILY
Qty: 90 TABLET | Refills: 1 | Status: SHIPPED | OUTPATIENT
Start: 2022-08-31

## 2022-08-31 NOTE — TELEPHONE ENCOUNTER
Requested Prescriptions     Pending Prescriptions Disp Refills    losartan-hydroCHLOROthiazide (HYZAAR) 100-25 mg per tablet 90 Tablet 1     Sig: Take 1 Tablet by mouth daily. Patient needs this to go to [facility] [address] instead of [facility].

## 2023-01-16 ENCOUNTER — LAB ONLY (OUTPATIENT)
Dept: INTERNAL MEDICINE CLINIC | Age: 63
End: 2023-01-16

## 2023-01-16 DIAGNOSIS — R73.03 PREDIABETES: ICD-10-CM

## 2023-01-17 LAB
EST. AVERAGE GLUCOSE BLD GHB EST-MCNC: 120 MG/DL
HBA1C MFR BLD: 5.8 % (ref 4.8–5.6)

## 2023-01-17 NOTE — PROGRESS NOTES
I will let him know at his upcoming apt that his A1C is unchanged at 5.8.      Dr. Gail Fraga  [facility]ists of [facility], [address] [facility], [address] [place] [address] Str.  Phone: [phone]  Fax: [phone]

## 2023-01-23 ENCOUNTER — OFFICE VISIT (OUTPATIENT)
Dept: INTERNAL MEDICINE CLINIC | Age: 63
End: 2023-01-23
Payer: MEDICAID

## 2023-01-23 VITALS
DIASTOLIC BLOOD PRESSURE: 77 MMHG | OXYGEN SATURATION: 97 % | RESPIRATION RATE: 14 BRPM | BODY MASS INDEX: 29.95 KG/M2 | HEIGHT: 73 IN | WEIGHT: 226 LBS | SYSTOLIC BLOOD PRESSURE: 114 MMHG | HEART RATE: 98 BPM | TEMPERATURE: 98.5 F

## 2023-01-23 DIAGNOSIS — I10 ESSENTIAL HYPERTENSION: ICD-10-CM

## 2023-01-23 DIAGNOSIS — J01.00 ACUTE MAXILLARY SINUSITIS, RECURRENCE NOT SPECIFIED: Primary | ICD-10-CM

## 2023-01-23 DIAGNOSIS — M54.50 CHRONIC LOW BACK PAIN, UNSPECIFIED BACK PAIN LATERALITY, UNSPECIFIED WHETHER SCIATICA PRESENT: ICD-10-CM

## 2023-01-23 DIAGNOSIS — G89.29 CHRONIC LOW BACK PAIN, UNSPECIFIED BACK PAIN LATERALITY, UNSPECIFIED WHETHER SCIATICA PRESENT: ICD-10-CM

## 2023-01-23 DIAGNOSIS — F12.90 CANNABIS USE, UNSPECIFIED, UNCOMPLICATED: ICD-10-CM

## 2023-01-23 DIAGNOSIS — E78.2 MIXED HYPERLIPIDEMIA: ICD-10-CM

## 2023-01-23 DIAGNOSIS — R73.03 PREDIABETES: ICD-10-CM

## 2023-01-23 PROCEDURE — 99212 OFFICE O/P EST SF 10 MIN: CPT | Performed by: INTERNAL MEDICINE

## 2023-01-23 PROCEDURE — 3074F SYST BP LT 130 MM HG: CPT | Performed by: INTERNAL MEDICINE

## 2023-01-23 PROCEDURE — 3078F DIAST BP <80 MM HG: CPT | Performed by: INTERNAL MEDICINE

## 2023-01-23 RX ORDER — DOXYCYCLINE 100 MG/1
100 TABLET ORAL 2 TIMES DAILY
Qty: 14 TABLET | Refills: 0 | Status: SHIPPED | OUTPATIENT
Start: 2023-01-23 | End: 2023-01-30

## 2023-01-23 RX ORDER — PREDNISONE 20 MG/1
40 TABLET ORAL
Qty: 10 TABLET | Refills: 0 | Status: SHIPPED | OUTPATIENT
Start: 2023-01-23

## 2023-01-23 NOTE — PROGRESS NOTES
INTERNISTS [facility]:  1/23/2023, MRN: [id number]      Kayli Nunes is a 58 y.o. male and presents to clinic for Follow-up and Labs (1-16-23)      Subjective: The patient is a 58-year-old male with history of lumbar disc disease (followed by ), chewing tobacco, HLD, prediabetes, and hypertension. 1.  Hypertension: Blood pressure is 114/77 on Hyzaar. 2.  Prediabetes: His A1c is unchanged at 5.8. His weight is up to 226 pounds from 219 pounds. 3.  Cannabis Use: At his last clinic, he reports that he can do it several days a week. He stated that he has been smoking cannabis since 1974. No tobacco use. Today he reports: he is smoking: every day. 4.  Chronic pain: He used to take narcotic rx but transitioned to cannabis for better pain control. His hip pain is better since transitioning to cannabis. 5.  Hyperlipidemia: Treated with pravastatin 40 mg nightly. No adverse side effects. Taking it regularly now that he takes it in the morning. 6. General: He had an eye exam given some blurry vision. He is to get some prescription glasses but has to schedule an apt for this. 7. Sinusitis: He has had sinus pressure x 1.5wks. No cough. He is taking sudafed and tylenol. +SOB. No CP. No sore throat or ear pain. Patient Active Problem List    Diagnosis Date Noted    Nicotine dependence 06/09/2021    Mixed hyperlipidemia 02/24/2020    Lumbar disc disease 08/11/2019    Essential hypertension 08/11/2019       Current Outpatient Medications   Medication Sig Dispense Refill    losartan-hydroCHLOROthiazide (HYZAAR) 100-25 mg per tablet Take 1 Tablet by mouth daily. 90 Tablet 1    pravastatin (PRAVACHOL) 40 mg tablet Take 1 Tablet by mouth nightly. 90 Tablet 3       Allergies   Allergen Reactions    Gabapentin Other (comments)     Fatigue. Cymbalta [Duloxetine] Other (comments)     Drowsiness.  Fatigue       Past Medical History:   Diagnosis Date    Hypertension        No past surgical history on file. Family History   Problem Relation Age of Onset    Hypertension Mother     Cancer Father         Throat    Hypertension Father     Cancer Maternal Grandmother     Cancer Maternal Grandfather     Heart Disease Paternal Grandfather        Social History     Tobacco Use    Smoking status: Never    Smokeless tobacco: Current     Types: Snuff   Substance Use Topics    Alcohol use: Yes     Comment: rare       ROS   Review of Systems   Constitutional:  Negative for chills and fever. HENT:  Positive for sinus pain. Negative for ear pain and sore throat. Eyes:  Positive for blurred vision (but he was told he needs to get glasses). Negative for pain. Respiratory:  Negative for cough and shortness of breath. Cardiovascular:  Negative for chest pain. Gastrointestinal:  Negative for abdominal pain, blood in stool and melena. Genitourinary:  Negative for dysuria and hematuria. Musculoskeletal:  Positive for back pain. Negative for joint pain and myalgias. Skin:  Negative for rash. Neurological:  Negative for headaches. Endo/Heme/Allergies:  Does not bruise/bleed easily. Psychiatric/Behavioral:  Positive for substance abuse (cannabis use). Objective     Vitals:    01/23/23 1028   Resp: 14   Temp: 98.5 °F (36.9 °C)   TempSrc: Temporal   Weight: 226 lb (102.5 kg)   Height: 6' 1\" (1.854 m)   PainSc:   0 - No pain       Physical Exam  Vitals and nursing note reviewed. Constitutional:       Appearance: Normal appearance. HENT:      Head: Normocephalic and atraumatic. Comments: Maxillary and frontal sinus areas bilaterally are tender to palpation     Right Ear: External ear normal.      Left Ear: External ear normal.      Nose: No congestion. Eyes:      General:         Right eye: No discharge. Left eye: No discharge. Extraocular Movements: Extraocular movements intact.       Conjunctiva/sclera: Conjunctivae normal.   Cardiovascular:      Rate and Rhythm: Normal rate and regular rhythm. Pulses: Normal pulses. Heart sounds: Normal heart sounds. Pulmonary:      Effort: Pulmonary effort is normal.      Breath sounds: Normal breath sounds. Abdominal:      General: Abdomen is flat. Bowel sounds are normal.      Palpations: Abdomen is soft. Tenderness: There is no abdominal tenderness. Musculoskeletal:         General: No swelling (BUE) or tenderness (BUE). Cervical back: Neck supple. Comments: Lumbar spinous processes are nontender to palpation. Lymphadenopathy:      Cervical: No cervical adenopathy. Skin:     General: Skin is warm and dry. Findings: No erythema or lesion. Neurological:      Mental Status: He is alert. Mental status is at baseline.       Gait: Gait normal.   Psychiatric:         Mood and Affect: Mood normal.       LABS   Data Review:   Lab Results   Component Value Date/Time    WBC 6.9 07/25/2022 10:11 AM    HGB 14.5 07/25/2022 10:11 AM    HCT 45.2 07/25/2022 10:11 AM    PLATELET 409 76/90/1638 10:11 AM    MCV 84 07/25/2022 10:11 AM       Lab Results   Component Value Date/Time    Sodium 141 07/11/2022 10:38 AM    Potassium 3.9 07/11/2022 10:38 AM    Chloride 103 07/11/2022 10:38 AM    CO2 18 (L) 07/11/2022 10:38 AM    Anion gap 8 02/25/2019 03:35 PM    Glucose 82 07/11/2022 10:38 AM    BUN 11 07/11/2022 10:38 AM    Creatinine 0.91 07/11/2022 10:38 AM    BUN/Creatinine ratio 12 07/11/2022 10:38 AM    GFR est AA 63 01/25/2022 11:32 AM    GFR est non-AA 55 (L) 01/25/2022 11:32 AM    Calcium 9.4 07/11/2022 10:38 AM       Lab Results   Component Value Date/Time    Cholesterol, total 159 07/11/2022 10:38 AM    HDL Cholesterol 39 (L) 07/11/2022 10:38 AM    LDL, calculated 97 07/11/2022 10:38 AM    LDL, calculated 172 (H) 02/20/2020 11:50 AM    VLDL, calculated 23 07/11/2022 10:38 AM    VLDL, calculated 42 (H) 02/20/2020 11:50 AM    Triglyceride 126 07/11/2022 10:38 AM       Lab Results   Component Value Date/Time Hemoglobin A1c 5.8 (H) 01/16/2023 10:23 AM       Assessment/Plan:   1. Prediabetes: Unchanged. His A1c is 5.8.  -I encouraged him to maintain a low-carb diet. - Checking labs in 6 months. ORDERS:  - METABOLIC PANEL, COMPREHENSIVE; Future  - HEMOGLOBIN A1C WITH EAG; Future    2. Essential hypertension: Stable. -Continue with medication as prescribed. - Return to clinic for BP check in 6 months. - Checking labs in 6 months. ORDERS:  - METABOLIC PANEL, COMPREHENSIVE; Future  - LIPID PANEL; Future  - MICROALBUMIN, UR, RAND W/ MICROALB/CREAT RATIO; Future  - HEMOGLOBIN A1C WITH EAG; Future    3. Mixed hyperlipidemia: Stable. He gained weight in between appointments.  -Continue with medication as prescribed. - I will check labs in 6 months.  -I encouraged him to reduce his weight by maintaining a heart healthy diet lower in calories. ORDERS:  - METABOLIC PANEL, COMPREHENSIVE; Future  - LIPID PANEL; Future    4. Cannabis use: Daily use. His current lower back pain has improved since using cannabis. This is better than him being on narcotic therapy, which he used to take. He states that his pain is better with cannabis and with prescription narcotic therapy. Ultimately, his chronic pain is well controlled. -The patient understands that recreational cannabis use is dangerous compared to medical marijuana via a dispensary. 5. Acute maxillary sinusitis: Per history and PE findings.   -Ordering prednisone. - I encouraged him to stop Sudafed. - Ordering a course of doxycycline to be taken for presumed bacterial sinusitis given the duration of his symptoms. ORDERS:  - predniSONE (DELTASONE) 20 mg tablet; Take 2 Tablets by mouth daily (with breakfast). Dispense: 10 Tablet; Refill: 0  - doxycycline (ADOXA) 100 mg tablet; Take 1 Tablet by mouth two (2) times a day for 7 days. Dispense: 14 Tablet; Refill: 0        ICD-10-CM ICD-9-CM    1.  Acute maxillary sinusitis, recurrence not specified  J01.00 461.0 predniSONE (DELTASONE) 20 mg tablet      doxycycline (ADOXA) 100 mg tablet      2. Prediabetes  J00.91 539.97 METABOLIC PANEL, COMPREHENSIVE      HEMOGLOBIN A1C WITH EAG      3. Essential hypertension  Y46 901.3 METABOLIC PANEL, COMPREHENSIVE      LIPID PANEL      MICROALBUMIN, UR, RAND W/ MICROALB/CREAT RATIO      HEMOGLOBIN A1C WITH EAG      4. Mixed hyperlipidemia  F30.9 552.6 METABOLIC PANEL, COMPREHENSIVE      LIPID PANEL      5. Cannabis use, unspecified, uncomplicated  Y25.53 723.62       6. Chronic low back pain, unspecified back pain laterality, unspecified whether sciatica present  M54.50 724.2     G89.29 338.29             Health Maintenance Due   Topic Date Due    DTaP/Tdap/Td series (1 - Tdap) Never done    Shingles Vaccine (1 of 2) Never done    COVID-19 Vaccine (3 - Booster for Pfizer series) 07/06/2021    Flu Vaccine (1) Never done         Lab review: labs are reviewed in the EHR and ordered as mentioned above. I have discussed the diagnosis with the patient and the intended plan as seen in the above orders. The patient has received an after-visit summary and questions were answered concerning future plans. I have discussed medication side effects and warnings with the patient as well. I have reviewed the plan of care with the patient, accepted their input and they are in agreement with the treatment goals. All questions were answered. The patient understands the plan of care. Handouts provided today with above information. Pt instructed if symptoms worsen to call the office or report to the ED for continued care. Greater than 50% of the visit time was spent in counseling and/or coordination of care. Voice recognition was used to generate this report, which may have resulted in some phonetic based errors in grammar and contents. Even though attempts were made to correct all the mistakes, some may have been missed, and remained in the body of the document.           Rhonda Burger, MD

## 2023-01-23 NOTE — PROGRESS NOTES
Giuliana Forbes. presents today for   Chief Complaint   Patient presents with    Follow-up    Labs     1-16-23       1. \"Have you been to the ER, urgent care clinic since your last visit? Hospitalized since your last visit? \" no    2. \"Have you seen or consulted any other health care providers outside of the [address] [place] [address] since your last visit? \" yes     3. For patients aged 39-70: Has the patient had a colonoscopy / FIT/ Cologuard? Yes - no Care Gap present      If the patient is female:    4. For patients aged 41-77: Has the patient had a mammogram within the past 2 years? NA - based on age or sex  See top three    5. For patients aged 21-65: Has the patient had a pap smear?  NA - based on age or sex

## 2023-02-05 DIAGNOSIS — I10 ESSENTIAL HYPERTENSION: ICD-10-CM

## 2023-02-05 DIAGNOSIS — E78.2 MIXED HYPERLIPIDEMIA: ICD-10-CM

## 2023-02-05 DIAGNOSIS — I10 ESSENTIAL HYPERTENSION: Primary | ICD-10-CM

## 2023-02-05 DIAGNOSIS — R73.03 PREDIABETES: Primary | ICD-10-CM

## 2023-02-07 DIAGNOSIS — I10 ESSENTIAL HYPERTENSION: ICD-10-CM

## 2023-02-07 DIAGNOSIS — R73.03 PREDIABETES: Primary | ICD-10-CM

## 2023-03-01 DIAGNOSIS — I10 ESSENTIAL (PRIMARY) HYPERTENSION: ICD-10-CM

## 2023-03-01 RX ORDER — LOSARTAN POTASSIUM AND HYDROCHLOROTHIAZIDE 25; 100 MG/1; MG/1
TABLET ORAL
Qty: 90 TABLET | Refills: 2 | Status: SHIPPED | OUTPATIENT
Start: 2023-03-01

## 2023-03-28 DIAGNOSIS — E78.2 MIXED HYPERLIPIDEMIA: ICD-10-CM

## 2023-03-28 RX ORDER — PRAVASTATIN SODIUM 40 MG
TABLET ORAL
Qty: 90 TABLET | Refills: 1 | Status: SHIPPED | OUTPATIENT
Start: 2023-03-28

## 2023-07-23 DIAGNOSIS — E78.2 MIXED HYPERLIPIDEMIA: ICD-10-CM

## 2023-07-23 DIAGNOSIS — R73.03 PREDIABETES: ICD-10-CM

## 2023-07-23 DIAGNOSIS — I10 ESSENTIAL HYPERTENSION: ICD-10-CM

## 2023-07-24 ENCOUNTER — NURSE ONLY (OUTPATIENT)
Age: 63
End: 2023-07-24

## 2023-07-24 DIAGNOSIS — R73.03 PREDIABETES: ICD-10-CM

## 2023-07-24 DIAGNOSIS — I10 ESSENTIAL HYPERTENSION: ICD-10-CM

## 2023-07-24 DIAGNOSIS — E78.2 MIXED HYPERLIPIDEMIA: ICD-10-CM

## 2023-07-25 LAB
ALBUMIN SERPL-MCNC: 4.3 G/DL (ref 3.9–4.9)
ALBUMIN/GLOB SERPL: 1.5 {RATIO} (ref 1.2–2.2)
ALP SERPL-CCNC: 58 IU/L (ref 44–121)
ALT SERPL-CCNC: 18 IU/L (ref 0–44)
AST SERPL-CCNC: 19 IU/L (ref 0–40)
BILIRUB SERPL-MCNC: 0.3 MG/DL (ref 0–1.2)
BUN SERPL-MCNC: 16 MG/DL (ref 8–27)
BUN/CREAT SERPL: 16 (ref 10–24)
CALCIUM SERPL-MCNC: 9.7 MG/DL (ref 8.6–10.2)
CHLORIDE SERPL-SCNC: 104 MMOL/L (ref 96–106)
CHOLEST SERPL-MCNC: 186 MG/DL (ref 100–199)
CO2 SERPL-SCNC: 21 MMOL/L (ref 20–29)
CREAT SERPL-MCNC: 1.02 MG/DL (ref 0.76–1.27)
EGFRCR SERPLBLD CKD-EPI 2021: 83 ML/MIN/1.73
GLOBULIN SER CALC-MCNC: 2.8 G/DL (ref 1.5–4.5)
GLUCOSE SERPL-MCNC: 97 MG/DL (ref 70–99)
HBA1C MFR BLD: 5.8 % (ref 4.8–5.6)
HDLC SERPL-MCNC: 40 MG/DL
LDLC SERPL CALC-MCNC: 118 MG/DL (ref 0–99)
POTASSIUM SERPL-SCNC: 4.8 MMOL/L (ref 3.5–5.2)
PROT SERPL-MCNC: 7.1 G/DL (ref 6–8.5)
SODIUM SERPL-SCNC: 142 MMOL/L (ref 134–144)
SPECIMEN STATUS REPORT: NORMAL
TRIGL SERPL-MCNC: 156 MG/DL (ref 0–149)
VLDLC SERPL CALC-MCNC: 28 MG/DL (ref 5–40)

## 2023-07-26 LAB
ALBUMIN/CREAT UR: 7 MG/G CREAT (ref 0–29)
CREAT UR-MCNC: 73.5 MG/DL
MICROALBUMIN UR-MCNC: 4.9 UG/ML

## 2023-07-31 ENCOUNTER — OFFICE VISIT (OUTPATIENT)
Age: 63
End: 2023-07-31
Payer: MEDICAID

## 2023-07-31 VITALS
HEIGHT: 73 IN | DIASTOLIC BLOOD PRESSURE: 86 MMHG | TEMPERATURE: 96 F | OXYGEN SATURATION: 97 % | WEIGHT: 222 LBS | SYSTOLIC BLOOD PRESSURE: 134 MMHG | RESPIRATION RATE: 18 BRPM | HEART RATE: 80 BPM | BODY MASS INDEX: 29.42 KG/M2

## 2023-07-31 DIAGNOSIS — R73.03 PREDIABETES: Primary | ICD-10-CM

## 2023-07-31 DIAGNOSIS — I10 ESSENTIAL HYPERTENSION: ICD-10-CM

## 2023-07-31 DIAGNOSIS — E78.2 MIXED HYPERLIPIDEMIA: ICD-10-CM

## 2023-07-31 DIAGNOSIS — L98.9 SKIN LESION: ICD-10-CM

## 2023-07-31 PROCEDURE — 3078F DIAST BP <80 MM HG: CPT | Performed by: INTERNAL MEDICINE

## 2023-07-31 PROCEDURE — 99214 OFFICE O/P EST MOD 30 MIN: CPT | Performed by: INTERNAL MEDICINE

## 2023-07-31 PROCEDURE — 3074F SYST BP LT 130 MM HG: CPT | Performed by: INTERNAL MEDICINE

## 2023-07-31 PROCEDURE — 99211 OFF/OP EST MAY X REQ PHY/QHP: CPT | Performed by: INTERNAL MEDICINE

## 2023-07-31 SDOH — ECONOMIC STABILITY: FOOD INSECURITY: WITHIN THE PAST 12 MONTHS, YOU WORRIED THAT YOUR FOOD WOULD RUN OUT BEFORE YOU GOT MONEY TO BUY MORE.: NEVER TRUE

## 2023-07-31 SDOH — ECONOMIC STABILITY: HOUSING INSECURITY
IN THE LAST 12 MONTHS, WAS THERE A TIME WHEN YOU DID NOT HAVE A STEADY PLACE TO SLEEP OR SLEPT IN A SHELTER (INCLUDING NOW)?: NO

## 2023-07-31 SDOH — ECONOMIC STABILITY: INCOME INSECURITY: HOW HARD IS IT FOR YOU TO PAY FOR THE VERY BASICS LIKE FOOD, HOUSING, MEDICAL CARE, AND HEATING?: NOT HARD AT ALL

## 2023-07-31 SDOH — ECONOMIC STABILITY: FOOD INSECURITY: WITHIN THE PAST 12 MONTHS, THE FOOD YOU BOUGHT JUST DIDN'T LAST AND YOU DIDN'T HAVE MONEY TO GET MORE.: NEVER TRUE

## 2023-07-31 ASSESSMENT — PATIENT HEALTH QUESTIONNAIRE - PHQ9
SUM OF ALL RESPONSES TO PHQ QUESTIONS 1-9: 0
1. LITTLE INTEREST OR PLEASURE IN DOING THINGS: 0
SUM OF ALL RESPONSES TO PHQ QUESTIONS 1-9: 0
2. FEELING DOWN, DEPRESSED OR HOPELESS: 0
SUM OF ALL RESPONSES TO PHQ9 QUESTIONS 1 & 2: 0

## 2023-07-31 NOTE — PROGRESS NOTES
Don Woody. presents today for   Chief Complaint   Patient presents with    Follow-up Chronic Condition                 1. \"Have you been to the ER, urgent care clinic since your last visit? Hospitalized since your last visit? \" no    2. \"Have you seen or consulted any other health care providers outside of the [address] [place] [address] since your last visit? \" no     3. For patients aged 43-73: Has the patient had a colonoscopy / FIT/ Cologuard? Yes - no Care Gap present      If the patient is female:    4. For patients aged 43-66: Has the patient had a mammogram within the past 2 years? NA - based on age or sex      11. For patients aged 21-65: Has the patient had a pap smear?  NA - based on age or sex
hypertension  I10       3. Mixed hyperlipidemia  E78.2       4. Skin lesion  L98.9 External Referral To Dermatology            Health Maintenance Due   Topic Date Due    HIV screen  Never done    DTaP/Tdap/Td vaccine (1 - Tdap) Never done    Shingles vaccine (1 of 2) Never done    COVID-19 Vaccine (3 - Booster for Pfizer series) 07/06/2021    Flu vaccine (1) Never done         Lab review: labs are reviewed in the EHR and ordered as mentioned above    I have discussed the diagnosis with the patient and the intended plan as seen in the above orders. The patient has received an after-visit summary and questions were answered concerning future plans. I have discussed medication side effects and warnings with the patient as well. I have reviewed the plan of care with the patient, accepted their input and they are in agreement with the treatment goals. All questions were answered. The patient understands the plan of care. Handouts provided today with above information. Pt instructed if symptoms worsen to call the office or report to the ED for continued care. Greater than 50% of the visit time was spent in counseling and/or coordination of care. Voice recognition was used to generate this report, which may have resulted in some phonetic based errors in grammar and contents. Even though attempts were made to correct all the mistakes, some may have been missed, and remained in the body of the document. No follow-up provider specified.     Mateo Walton MD

## 2023-08-06 ASSESSMENT — ENCOUNTER SYMPTOMS
EYE PAIN: 0
COUGH: 0
BLOOD IN STOOL: 0
ANAL BLEEDING: 0
ABDOMINAL PAIN: 0
BACK PAIN: 1
SHORTNESS OF BREATH: 0
SORE THROAT: 0

## 2023-09-07 DIAGNOSIS — E78.2 MIXED HYPERLIPIDEMIA: ICD-10-CM

## 2023-09-07 DIAGNOSIS — I10 ESSENTIAL (PRIMARY) HYPERTENSION: ICD-10-CM

## 2023-09-07 RX ORDER — PRAVASTATIN SODIUM 40 MG
40 TABLET ORAL NIGHTLY
Qty: 90 TABLET | Refills: 1 | Status: SHIPPED | OUTPATIENT
Start: 2023-09-07

## 2023-09-07 RX ORDER — LOSARTAN POTASSIUM AND HYDROCHLOROTHIAZIDE 25; 100 MG/1; MG/1
1 TABLET ORAL DAILY
Qty: 90 TABLET | Refills: 2 | Status: SHIPPED | OUTPATIENT
Start: 2023-09-07

## 2023-09-07 NOTE — TELEPHONE ENCOUNTER
pravastatin (PRAVACHOL) 40 MG tablet    losartan-hydroCHLOROthiazide (HYZAAR) 100-25 MG per tablet    [facility]/pharmacy #[id number]- [place], [place] - [address] - P [phone] Hulan Runner [phone]   [address] [place] [address], [facility],[address] [id number]   Phone:  [phone]  Fax:  [phone]    Patient [place] and is aware of the situation that after 10/1 he may not be covered by us anymore. He has already found a new doctor but isn't able to be seen by May and is requesting if refills can be sent for these to last him until then. Please advise.

## 2023-12-31 DIAGNOSIS — R73.03 PREDIABETES: ICD-10-CM

## 2024-01-30 LAB — HBA1C MFR BLD: 5.8 % (ref 4.8–5.6)

## 2024-02-16 ENCOUNTER — OFFICE VISIT (OUTPATIENT)
Age: 64
End: 2024-02-16
Payer: MEDICAID

## 2024-02-16 VITALS
RESPIRATION RATE: 18 BRPM | HEART RATE: 95 BPM | BODY MASS INDEX: 30.8 KG/M2 | OXYGEN SATURATION: 97 % | DIASTOLIC BLOOD PRESSURE: 73 MMHG | WEIGHT: 232.4 LBS | TEMPERATURE: 98 F | HEIGHT: 73 IN | SYSTOLIC BLOOD PRESSURE: 128 MMHG

## 2024-02-16 DIAGNOSIS — L30.9 DERMATITIS: Primary | ICD-10-CM

## 2024-02-16 DIAGNOSIS — Z12.5 ENCOUNTER FOR SCREENING FOR MALIGNANT NEOPLASM OF PROSTATE: ICD-10-CM

## 2024-02-16 DIAGNOSIS — R73.03 PREDIABETES: ICD-10-CM

## 2024-02-16 DIAGNOSIS — I10 ESSENTIAL (PRIMARY) HYPERTENSION: ICD-10-CM

## 2024-02-16 DIAGNOSIS — L98.9 SKIN LESION: ICD-10-CM

## 2024-02-16 DIAGNOSIS — E78.2 MIXED HYPERLIPIDEMIA: ICD-10-CM

## 2024-02-16 PROCEDURE — 99211 OFF/OP EST MAY X REQ PHY/QHP: CPT | Performed by: INTERNAL MEDICINE

## 2024-02-16 PROCEDURE — 99214 OFFICE O/P EST MOD 30 MIN: CPT | Performed by: INTERNAL MEDICINE

## 2024-02-16 PROCEDURE — 3078F DIAST BP <80 MM HG: CPT | Performed by: INTERNAL MEDICINE

## 2024-02-16 PROCEDURE — 3074F SYST BP LT 130 MM HG: CPT | Performed by: INTERNAL MEDICINE

## 2024-02-16 ASSESSMENT — PATIENT HEALTH QUESTIONNAIRE - PHQ9
SUM OF ALL RESPONSES TO PHQ9 QUESTIONS 1 & 2: 0
2. FEELING DOWN, DEPRESSED OR HOPELESS: 0
SUM OF ALL RESPONSES TO PHQ QUESTIONS 1-9: 0
SUM OF ALL RESPONSES TO PHQ QUESTIONS 1-9: 0
1. LITTLE INTEREST OR PLEASURE IN DOING THINGS: 0
SUM OF ALL RESPONSES TO PHQ QUESTIONS 1-9: 0
SUM OF ALL RESPONSES TO PHQ QUESTIONS 1-9: 0

## 2024-02-16 NOTE — PROGRESS NOTES
Spike Wagner Jr. presents today for   Chief Complaint   Patient presents with    Follow-up     6 months follow up    Other     prediabetes    Hypertension     6 months follow up    Cholesterol Problem     6 months follow up           \"Have you been to the ER, urgent care clinic since your last visit?  Hospitalized since your last visit?\"    NO    “Have you seen or consulted any other health care providers outside of [facility] since your last visit?”    YES - When: approximately 3 months ago.  Where and Why: Opthamology.

## 2024-02-16 NOTE — PROGRESS NOTES
INTERNISTS [facility]:  2/16/2024, MRN: [id number]      Spike Wagner Jr. is a 64 y.o. male and presents to clinic for Follow-up (6 months follow up), Other (prediabetes), Hypertension (6 months follow up), and Cholesterol Problem (6 months follow up)      Subjective:   The patient is a 64 -year-old male with history of lumbar disc disease (followed by ), chewing tobacco, HLD, chronic pain, cannabis use (for pain relief), prediabetes, and hypertension.    1. HTN: BP is 128/73.On Hyzaar 100-25mg daily.     2. Prediabetes: February labs show that his A1c is unchanged measuring 5.8. He continues to smoke cannabis.    3. HLD: On pravastatin 40mg daily.  His last LDL was 118 in July.    4. Tobacco Use: +One can of chewing tobacco last 1 wk. He has cut back. No cigarettes.    5. Left forearm dermatitis/skin lesion Hx: He has more spots (Sks) along his left forearm and right forearm.  He has had more and more skin lesions  show up in the past 2 months.  He also has dry flaky skin along his scalp but will apply head and shoulders shampoo.  +B/l forearms are itchy off/on.  No pain. His dad had psoriasis.      Patient Active Problem List    Diagnosis Date Noted    Nicotine dependence 06/09/2021    Mixed hyperlipidemia 02/24/2020    Essential hypertension 08/11/2019    Lumbar disc disease 08/11/2019       Current Outpatient Medications   Medication Sig Dispense Refill    losartan-hydroCHLOROthiazide (HYZAAR) 100-25 MG per tablet Take 1 tablet by mouth daily 90 tablet 2    pravastatin (PRAVACHOL) 40 MG tablet Take 1 tablet by mouth nightly 90 tablet 1     No current facility-administered medications for this visit.       Allergies   Allergen Reactions    Gabapentin Other (See Comments)     Fatigue.    Duloxetine Other (See Comments)     Drowsiness. Fatigue       Past Medical History:   Diagnosis Date    Hypertension        No past surgical history on file.    Family History   Problem Relation Age of Onset

## 2024-02-22 ASSESSMENT — ENCOUNTER SYMPTOMS
BACK PAIN: 1
BLOOD IN STOOL: 0
COUGH: 0
EYE PAIN: 0
ANAL BLEEDING: 0
ABDOMINAL PAIN: 0
SORE THROAT: 0
SHORTNESS OF BREATH: 0

## 2024-04-09 DIAGNOSIS — E78.2 MIXED HYPERLIPIDEMIA: ICD-10-CM

## 2024-04-09 RX ORDER — PRAVASTATIN SODIUM 40 MG
40 TABLET ORAL NIGHTLY
Qty: 90 TABLET | Refills: 1 | Status: SHIPPED | OUTPATIENT
Start: 2024-04-09

## 2024-04-12 ENCOUNTER — TELEPHONE (OUTPATIENT)
Age: 64
End: 2024-04-12

## 2024-04-12 NOTE — TELEPHONE ENCOUNTER
Patient calling requesting something be sent to pharmacy for allergies, pollen is really getting to him. He stated he got something prescribed previously but he can not remember the name.     Pharmacy [id number] - [place], [place] - [address] - P [phone] - F [phone] [[id number]]

## 2024-04-14 RX ORDER — FLUTICASONE PROPIONATE 50 MCG
1 SPRAY, SUSPENSION (ML) NASAL DAILY
Qty: 32 G | Refills: 5 | Status: SHIPPED | OUTPATIENT
Start: 2024-04-14

## 2024-04-14 RX ORDER — CETIRIZINE HYDROCHLORIDE 10 MG/1
10 TABLET ORAL DAILY
Qty: 30 TABLET | Refills: 5 | Status: SHIPPED | OUTPATIENT
Start: 2024-04-14

## 2024-07-10 DIAGNOSIS — I10 ESSENTIAL (PRIMARY) HYPERTENSION: ICD-10-CM

## 2024-07-12 RX ORDER — LOSARTAN POTASSIUM AND HYDROCHLOROTHIAZIDE 25; 100 MG/1; MG/1
1 TABLET ORAL DAILY
Qty: 90 TABLET | Refills: 2 | Status: SHIPPED | OUTPATIENT
Start: 2024-07-12

## 2024-08-16 ENCOUNTER — OFFICE VISIT (OUTPATIENT)
Facility: CLINIC | Age: 64
End: 2024-08-16
Payer: MEDICAID

## 2024-08-16 VITALS
BODY MASS INDEX: 29.42 KG/M2 | OXYGEN SATURATION: 96 % | SYSTOLIC BLOOD PRESSURE: 130 MMHG | WEIGHT: 222 LBS | DIASTOLIC BLOOD PRESSURE: 83 MMHG | HEART RATE: 63 BPM | TEMPERATURE: 97.4 F | HEIGHT: 73 IN | RESPIRATION RATE: 18 BRPM

## 2024-08-16 DIAGNOSIS — I10 ESSENTIAL (PRIMARY) HYPERTENSION: ICD-10-CM

## 2024-08-16 DIAGNOSIS — R73.03 PREDIABETES: ICD-10-CM

## 2024-08-16 DIAGNOSIS — F17.220 CHEWING TOBACCO DEPENDENCE: ICD-10-CM

## 2024-08-16 DIAGNOSIS — E78.2 MIXED HYPERLIPIDEMIA: ICD-10-CM

## 2024-08-16 DIAGNOSIS — L98.9 SKIN LESION: ICD-10-CM

## 2024-08-16 DIAGNOSIS — M25.552 LEFT HIP PAIN: Primary | ICD-10-CM

## 2024-08-16 DIAGNOSIS — Z12.5 ENCOUNTER FOR PROSTATE CANCER SCREENING: ICD-10-CM

## 2024-08-16 PROCEDURE — 3079F DIAST BP 80-89 MM HG: CPT | Performed by: INTERNAL MEDICINE

## 2024-08-16 PROCEDURE — 99214 OFFICE O/P EST MOD 30 MIN: CPT | Performed by: INTERNAL MEDICINE

## 2024-08-16 PROCEDURE — 3075F SYST BP GE 130 - 139MM HG: CPT | Performed by: INTERNAL MEDICINE

## 2024-08-16 RX ORDER — METHYLPREDNISOLONE 4 MG/1
TABLET ORAL
Qty: 1 KIT | Refills: 0 | Status: SHIPPED | OUTPATIENT
Start: 2024-08-16 | End: 2024-08-22

## 2024-08-16 SDOH — ECONOMIC STABILITY: INCOME INSECURITY: HOW HARD IS IT FOR YOU TO PAY FOR THE VERY BASICS LIKE FOOD, HOUSING, MEDICAL CARE, AND HEATING?: VERY HARD

## 2024-08-16 SDOH — ECONOMIC STABILITY: FOOD INSECURITY: WITHIN THE PAST 12 MONTHS, YOU WORRIED THAT YOUR FOOD WOULD RUN OUT BEFORE YOU GOT MONEY TO BUY MORE.: NEVER TRUE

## 2024-08-16 SDOH — ECONOMIC STABILITY: FOOD INSECURITY: WITHIN THE PAST 12 MONTHS, THE FOOD YOU BOUGHT JUST DIDN'T LAST AND YOU DIDN'T HAVE MONEY TO GET MORE.: NEVER TRUE

## 2024-08-16 NOTE — PROGRESS NOTES
INTERNISTS OF [facility]:  8/22/2024, MRN: [id number]      Spike Wagner Jr. is a 64 y.o. male and presents to clinic for Follow-up (6 mon f/u)      Subjective:   The patient is a 64 -year-old male with history of lumbar disc disease (followed by ), chewing tobacco, HLD, chronic pain, cannabis use (for pain relief), prediabetes, and hypertension.     1.  Hypertension: Blood pressure is 130/83 on Hyzaar 100-25 mg daily.    2.  Skin lesions: Noted along bilateral forearms.  At his last visit I referred him to dermatology.  Today he reports: he never saw Dermatology. He reports skin tags along his right neck.     3.  Prediabetes/HLD: His A1c was 5.8 in February.  His A1c per recent lab work is 5.9.  He is taking pravastatin 40 mg daily. Recent lab work shows a normal CMP. He declines having a stronger statin on board.     Latest Reference Range & Units 07/24/23 00:00 08/09/24 09:37   Cholesterol, Total 100 - 199 mg/dL 186 192   HDL Cholesterol >39 mg/dL 40 39 (L)   LDL Cholesterol 0 - 99 mg/dL 118 (H) 129 (H)   Triglycerides 0 - 149 mg/dL 156 (H) 133   (L): Data is abnormally low  (H): Data is abnormally high    4.  Tobacco use: At his last appointment he reports chewing tobacco.  1 can would last 1 week. Today he reports: his chewing tobacco is lasting 3-4 days. +Stress encourages him to dip. His girlfriend had a stroke 5 wks ago.     5.  General: His PSA measured 3.6.  Urinary symptoms: none.     6. Left Hip Pain: He fell off a ladder in between apts in July. He had hip pain since then. Pain is easing up but still present. [place] \"but both times were my fault - being stupid.\" He fell off a chair in June after slipping accidentally.  Tylenol helps a little bit for relief. Pain is off/on. No paresthesias or worsening of his chronic lower back pain. +H/o lumbar disc disease.       Patient Active Problem List    Diagnosis Date Noted    Nicotine dependence 06/09/2021    Mixed

## 2024-08-16 NOTE — PROGRESS NOTES
Spike Wagner Jr. presents today for   Chief Complaint   Patient presents with    Follow-up     6 mon f/u         \"Have you been to the ER, urgent care clinic since your last visit?  Hospitalized since your last visit?\"    NO    “Have you seen or consulted any other health care providers outside of [facility] since your last visit?”    NO    “Have you had a colorectal cancer screening such as a colonoscopy/FIT/Cologuard?    Yes; cologuard    No colonoscopy on file  Date of last Cologuard: 3/16/2021  No FIT/FOBT on file   No flexible sigmoidoscopy on file

## 2024-08-22 ASSESSMENT — ENCOUNTER SYMPTOMS
EYE PAIN: 0
SHORTNESS OF BREATH: 0
COUGH: 0
SORE THROAT: 0
ABDOMINAL PAIN: 0
ANAL BLEEDING: 0
BLOOD IN STOOL: 0

## 2024-09-06 NOTE — PROGRESS NOTES
Carlos Gomes. presents today for   Chief Complaint   Patient presents with    Follow-up          1. \"Have you been to the ER, urgent care clinic since your last visit? Hospitalized since your last visit? \" no    2. \"Have you seen or consulted any other health care providers outside of the [address] [place] [address] since your last visit? \" yes     3. For patients aged 39-70: Has the patient had a colonoscopy?  Yes see hyperlink in HM karlee

## 2024-10-07 DIAGNOSIS — E78.2 MIXED HYPERLIPIDEMIA: ICD-10-CM

## 2024-10-10 RX ORDER — PRAVASTATIN SODIUM 40 MG
40 TABLET ORAL NIGHTLY
Qty: 90 TABLET | Refills: 1 | Status: SHIPPED | OUTPATIENT
Start: 2024-10-10

## 2024-11-01 ENCOUNTER — TELEPHONE (OUTPATIENT)
Facility: CLINIC | Age: 64
End: 2024-11-01

## 2024-11-01 NOTE — TELEPHONE ENCOUNTER
Called and spoke to pt who c/o headache and scratchy throat x 2 days. No chills and fever. Pt has no other sx. Pt has taken Tylenol. The light hurts his eyes a little pt states. No hx of migraines noted.         EBONY David LPN

## 2024-11-01 NOTE — TELEPHONE ENCOUNTER
Tammy called on behalf of pt to see if he could be seen today. Pt could have a possible sinus infection. Pt's symptoms are headache, sore throat, congestion. Pt has no fever. Advised Tammy that pcp's schedule is full for today. She wanted to know if pcp could fit pt into her schedule for today. Tammy would also like to know if there's anything that she advises pt could take otc for his symptoms that wouldn't affect his blood pressure. Please advise.

## 2024-11-01 NOTE — TELEPHONE ENCOUNTER
No SOB. +Cough with a little phlegm. +Photophobia. This has never happened before. No congestion. +Sick contacts - his grandson. His grandson tested neg for Covid, flu, and strep throat.  He will notify me if sx worsen over the weekend.    Dr. Lucero Rivera  Internists of [address], [address]  [place] [address]  Phone: [phone]  Fax: [phone]

## 2025-02-17 ENCOUNTER — OFFICE VISIT (OUTPATIENT)
Facility: CLINIC | Age: 65
End: 2025-02-17

## 2025-02-17 VITALS
HEART RATE: 87 BPM | BODY MASS INDEX: 30.17 KG/M2 | WEIGHT: 227.6 LBS | TEMPERATURE: 98.6 F | SYSTOLIC BLOOD PRESSURE: 134 MMHG | DIASTOLIC BLOOD PRESSURE: 88 MMHG | HEIGHT: 73 IN | RESPIRATION RATE: 17 BRPM | OXYGEN SATURATION: 98 %

## 2025-02-17 DIAGNOSIS — I10 ESSENTIAL (PRIMARY) HYPERTENSION: ICD-10-CM

## 2025-02-17 DIAGNOSIS — E78.2 MIXED HYPERLIPIDEMIA: Primary | ICD-10-CM

## 2025-02-17 DIAGNOSIS — R73.03 PREDIABETES: ICD-10-CM

## 2025-02-17 DIAGNOSIS — F17.220 CHEWING TOBACCO DEPENDENCE: ICD-10-CM

## 2025-02-17 DIAGNOSIS — F12.90 CANNABIS USE WITHOUT COMPLICATION: ICD-10-CM

## 2025-02-17 DIAGNOSIS — M25.512 LEFT SHOULDER PAIN, UNSPECIFIED CHRONICITY: ICD-10-CM

## 2025-02-17 DIAGNOSIS — Z12.11 SCREENING FOR MALIGNANT NEOPLASM OF COLON: ICD-10-CM

## 2025-02-17 RX ORDER — CELECOXIB 200 MG/1
200 CAPSULE ORAL DAILY
Qty: 30 CAPSULE | Refills: 0 | Status: SHIPPED | OUTPATIENT
Start: 2025-02-17 | End: 2025-03-19

## 2025-02-17 ASSESSMENT — ENCOUNTER SYMPTOMS
SHORTNESS OF BREATH: 0
SORE THROAT: 0
ANAL BLEEDING: 0
ABDOMINAL PAIN: 0
COUGH: 0
EYE PAIN: 0
BLOOD IN STOOL: 0

## 2025-02-17 NOTE — PROGRESS NOTES
INTERNISTS [facility]:  2/23/2025, MRN: [id number]      Spike Wagner Jr. is a 65 y.o. male and presents to clinic for Follow-up (6 mon f/u)      Subjective:   The patient is a 65-year-old male with history of lumbar disc disease (followed by ), chewing tobacco, HLD, chronic pain, cannabis use (for pain relief), prediabetes, and hypertension.     1. HTN: BP is 134/88. Taking hyzaar 100-25mg daily.     2. Prediabetes: His A1c per recent labs is WNL.     3. HLD: His LDL improved. Taking pravastatin 40mg daily.      Latest Reference Range & Units 08/09/24 09:37 02/11/25 10:32   Cholesterol, Total 100 - 199 mg/dL 192 187   HDL Cholesterol >39 mg/dL 39 (L) 45   LDL Cholesterol 0 - 99 mg/dL 129 (H) 107 (H)   Triglycerides 0 - 149 mg/dL 133 201 (H)   (L): Data is abnormally low  (H): Data is abnormally high    4. Health Maintenance:  -+Frequency - stable.     Latest Reference Range & Units 08/09/24 09:37 02/11/25 10:32   PSA 0.0 - 4.0 ng/mL 3.6 2.3   PSA, Free N/A ng/mL 0.95 0.92   PSA, Free Pct % 26.4 40.0     - +Cannabis - still doesn't have a cannabis card.  - Chewing tobacco: off/on.   - No other recreational drug use.   - +ETOH use: off/on.  - Colon cancer screening: Overdue.      Patient Active Problem List    Diagnosis Date Noted    Nicotine dependence 06/09/2021    Mixed hyperlipidemia 02/24/2020    Essential hypertension 08/11/2019    Lumbar disc disease 08/11/2019       Current Outpatient Medications   Medication Sig Dispense Refill    celecoxib (CELEBREX) 200 MG capsule Take 1 capsule by mouth daily 30 capsule 0    pravastatin (PRAVACHOL) 40 MG tablet TAKE 1 TABLET BY MOUTH EVERY DAY AT NIGHT 90 tablet 1    losartan-hydroCHLOROthiazide (HYZAAR) 100-25 MG per tablet TAKE 1 TABLET BY MOUTH EVERY DAY 90 tablet 2    cetirizine (ZYRTEC) 10 MG tablet Take 1 tablet by mouth daily (Patient not taking: Reported on 8/16/2024) 30 tablet 5    fluticasone (FLONASE) 50 MCG/ACT nasal spray 1 spray by Each Nostril

## 2025-02-17 NOTE — PROGRESS NOTES
Spike Wagner Jr. presents today for   Chief Complaint   Patient presents with    Follow-up     6 mon f/u     BP Recheck: 134/88       \"Have you been to the ER, urgent care clinic since your last visit?  Hospitalized since your last visit?\"    YES - When: approximately 1 months ago.  Where and Why: [facility]-flu-like sx.    “Have you seen or consulted any other health care providers outside of [facility] since your last visit?”    NO    “Have you had a colorectal cancer screening such as a colonoscopy/FIT/Cologuard?    NO    No colonoscopy on file  Date of last Cologuard: 3/16/2021  No FIT/FOBT on file   No flexible sigmoidoscopy on file

## 2025-04-11 DIAGNOSIS — E78.2 MIXED HYPERLIPIDEMIA: ICD-10-CM

## 2025-04-11 DIAGNOSIS — I10 ESSENTIAL (PRIMARY) HYPERTENSION: ICD-10-CM

## 2025-04-14 RX ORDER — PRAVASTATIN SODIUM 40 MG
40 TABLET ORAL NIGHTLY
Qty: 90 TABLET | Refills: 1 | Status: SHIPPED | OUTPATIENT
Start: 2025-04-14

## 2025-04-14 RX ORDER — LOSARTAN POTASSIUM AND HYDROCHLOROTHIAZIDE 25; 100 MG/1; MG/1
1 TABLET ORAL DAILY
Qty: 90 TABLET | Refills: 1 | Status: SHIPPED | OUTPATIENT
Start: 2025-04-14

## 2025-04-16 ENCOUNTER — TELEPHONE (OUTPATIENT)
Facility: CLINIC | Age: 65
End: 2025-04-16

## 2025-08-18 ENCOUNTER — HOSPITAL ENCOUNTER (OUTPATIENT)
Facility: HOSPITAL | Age: 65
Setting detail: SPECIMEN
Discharge: HOME | End: 2025-08-21
Payer: MEDICARE

## 2025-08-18 ENCOUNTER — LAB (OUTPATIENT)
Facility: CLINIC | Age: 65
End: 2025-08-18

## 2025-08-18 DIAGNOSIS — E78.2 MIXED HYPERLIPIDEMIA: ICD-10-CM

## 2025-08-18 DIAGNOSIS — I10 ESSENTIAL (PRIMARY) HYPERTENSION: Primary | ICD-10-CM

## 2025-08-18 DIAGNOSIS — R73.03 PREDIABETES: ICD-10-CM

## 2025-08-18 DIAGNOSIS — I10 ESSENTIAL (PRIMARY) HYPERTENSION: ICD-10-CM

## 2025-08-18 LAB
ALBUMIN SERPL-MCNC: 3.7 G/DL (ref 3.4–5)
ALBUMIN/GLOB SERPL: 1.1 (ref 0.8–1.7)
ALP SERPL-CCNC: 55 U/L (ref 45–117)
ALT SERPL-CCNC: 27 U/L (ref 10–50)
ANION GAP SERPL CALC-SCNC: 12 MMOL/L (ref 3–18)
AST SERPL-CCNC: 28 U/L (ref 10–38)
BILIRUB SERPL-MCNC: 0.4 MG/DL (ref 0.2–1)
BUN SERPL-MCNC: 23 MG/DL (ref 6–23)
BUN/CREAT SERPL: 18 (ref 12–20)
CALCIUM SERPL-MCNC: 9.8 MG/DL (ref 8.5–10.1)
CHLORIDE SERPL-SCNC: 107 MMOL/L (ref 98–107)
CHOLEST SERPL-MCNC: 207 MG/DL
CO2 SERPL-SCNC: 22 MMOL/L (ref 21–32)
CREAT SERPL-MCNC: 1.24 MG/DL (ref 0.6–1.3)
EST. AVERAGE GLUCOSE BLD GHB EST-MCNC: 116 MG/DL
GLOBULIN SER CALC-MCNC: 3.4 G/DL (ref 2–4)
GLUCOSE SERPL-MCNC: 104 MG/DL (ref 74–108)
HBA1C MFR BLD: 5.7 % (ref 4.2–5.6)
HDLC SERPL-MCNC: 43 MG/DL (ref 40–60)
HDLC SERPL: 4.8 (ref 0–5)
LDLC SERPL CALC-MCNC: 121 MG/DL (ref 0–100)
POTASSIUM SERPL-SCNC: 4.3 MMOL/L (ref 3.5–5.5)
PROT SERPL-MCNC: 7.1 G/DL (ref 6.4–8.2)
SODIUM SERPL-SCNC: 142 MMOL/L (ref 136–145)
TRIGL SERPL-MCNC: 218 MG/DL (ref 0–150)
VLDLC SERPL CALC-MCNC: 44 MG/DL

## 2025-08-18 PROCEDURE — 36415 COLL VENOUS BLD VENIPUNCTURE: CPT

## 2025-08-18 PROCEDURE — 80061 LIPID PANEL: CPT

## 2025-08-18 PROCEDURE — 83036 HEMOGLOBIN GLYCOSYLATED A1C: CPT

## 2025-08-18 PROCEDURE — 80053 COMPREHEN METABOLIC PANEL: CPT

## 2025-08-25 ENCOUNTER — OFFICE VISIT (OUTPATIENT)
Facility: CLINIC | Age: 65
End: 2025-08-25

## 2025-08-25 VITALS
RESPIRATION RATE: 18 BRPM | HEIGHT: 73 IN | TEMPERATURE: 98.7 F | SYSTOLIC BLOOD PRESSURE: 119 MMHG | WEIGHT: 221 LBS | BODY MASS INDEX: 29.29 KG/M2 | DIASTOLIC BLOOD PRESSURE: 73 MMHG | OXYGEN SATURATION: 96 % | HEART RATE: 86 BPM

## 2025-08-25 DIAGNOSIS — E78.2 MIXED HYPERLIPIDEMIA: Primary | ICD-10-CM

## 2025-08-25 DIAGNOSIS — R07.89 ATYPICAL CHEST PAIN: ICD-10-CM

## 2025-08-25 DIAGNOSIS — Z71.89 ADVANCE CARE PLANNING: ICD-10-CM

## 2025-08-25 DIAGNOSIS — L98.9 SKIN LESION OF LEFT ARM: ICD-10-CM

## 2025-08-25 DIAGNOSIS — R73.03 PREDIABETES: ICD-10-CM

## 2025-08-25 DIAGNOSIS — Z00.00 WELCOME TO MEDICARE PREVENTIVE VISIT: ICD-10-CM

## 2025-08-25 DIAGNOSIS — M25.512 LEFT SHOULDER PAIN, UNSPECIFIED CHRONICITY: ICD-10-CM

## 2025-08-25 DIAGNOSIS — F12.90 CANNABIS USE WITHOUT COMPLICATION: ICD-10-CM

## 2025-08-25 DIAGNOSIS — R53.83 OTHER FATIGUE: ICD-10-CM

## 2025-08-25 DIAGNOSIS — I10 ESSENTIAL (PRIMARY) HYPERTENSION: ICD-10-CM

## 2025-08-25 DIAGNOSIS — R20.2 PARESTHESIA OF UPPER LIMB: ICD-10-CM

## 2025-08-25 DIAGNOSIS — Z12.5 ENCOUNTER FOR PROSTATE CANCER SCREENING: ICD-10-CM

## 2025-08-25 DIAGNOSIS — Z12.11 SCREENING FOR COLON CANCER: ICD-10-CM

## 2025-08-25 DIAGNOSIS — F17.220 CHEWING TOBACCO DEPENDENCE: ICD-10-CM

## 2025-08-25 ASSESSMENT — PATIENT HEALTH QUESTIONNAIRE - PHQ9
SUM OF ALL RESPONSES TO PHQ QUESTIONS 1-9: 1
1. LITTLE INTEREST OR PLEASURE IN DOING THINGS: SEVERAL DAYS
2. FEELING DOWN, DEPRESSED OR HOPELESS: NOT AT ALL
SUM OF ALL RESPONSES TO PHQ QUESTIONS 1-9: 1

## 2025-08-25 ASSESSMENT — LIFESTYLE VARIABLES
HOW OFTEN DO YOU HAVE A DRINK CONTAINING ALCOHOL: NEVER
HOW MANY STANDARD DRINKS CONTAINING ALCOHOL DO YOU HAVE ON A TYPICAL DAY: PATIENT DOES NOT DRINK

## 2025-09-02 ENCOUNTER — OFFICE VISIT (OUTPATIENT)
Age: 65
End: 2025-09-02
Payer: MEDICARE

## 2025-09-02 VITALS
HEIGHT: 73 IN | SYSTOLIC BLOOD PRESSURE: 136 MMHG | OXYGEN SATURATION: 97 % | BODY MASS INDEX: 29.03 KG/M2 | DIASTOLIC BLOOD PRESSURE: 84 MMHG | HEART RATE: 56 BPM | WEIGHT: 219 LBS

## 2025-09-02 DIAGNOSIS — E78.2 MIXED HYPERLIPIDEMIA: ICD-10-CM

## 2025-09-02 DIAGNOSIS — I10 ESSENTIAL HYPERTENSION: ICD-10-CM

## 2025-09-02 DIAGNOSIS — F12.10 MARIJUANA ABUSE, CONTINUOUS: ICD-10-CM

## 2025-09-02 DIAGNOSIS — R07.9 CHEST PAIN, UNSPECIFIED TYPE: Primary | ICD-10-CM

## 2025-09-02 PROCEDURE — 1123F ACP DISCUSS/DSCN MKR DOCD: CPT | Performed by: INTERNAL MEDICINE

## 2025-09-02 PROCEDURE — 3079F DIAST BP 80-89 MM HG: CPT | Performed by: INTERNAL MEDICINE

## 2025-09-02 PROCEDURE — G8427 DOCREV CUR MEDS BY ELIG CLIN: HCPCS | Performed by: INTERNAL MEDICINE

## 2025-09-02 PROCEDURE — 93000 ELECTROCARDIOGRAM COMPLETE: CPT | Performed by: INTERNAL MEDICINE

## 2025-09-02 PROCEDURE — 3017F COLORECTAL CA SCREEN DOC REV: CPT | Performed by: INTERNAL MEDICINE

## 2025-09-02 PROCEDURE — 4004F PT TOBACCO SCREEN RCVD TLK: CPT | Performed by: INTERNAL MEDICINE

## 2025-09-02 PROCEDURE — 99204 OFFICE O/P NEW MOD 45 MIN: CPT | Performed by: INTERNAL MEDICINE

## 2025-09-02 PROCEDURE — 3075F SYST BP GE 130 - 139MM HG: CPT | Performed by: INTERNAL MEDICINE

## 2025-09-02 PROCEDURE — G8417 CALC BMI ABV UP PARAM F/U: HCPCS | Performed by: INTERNAL MEDICINE

## 2025-09-02 ASSESSMENT — ENCOUNTER SYMPTOMS
CHEST TIGHTNESS: 1
ABDOMINAL DISTENTION: 0
ABDOMINAL PAIN: 0
VOMITING: 0
COUGH: 0
NAUSEA: 0
SHORTNESS OF BREATH: 1

## 2025-09-02 ASSESSMENT — PATIENT HEALTH QUESTIONNAIRE - PHQ9
1. LITTLE INTEREST OR PLEASURE IN DOING THINGS: NOT AT ALL
SUM OF ALL RESPONSES TO PHQ QUESTIONS 1-9: 0
2. FEELING DOWN, DEPRESSED OR HOPELESS: NOT AT ALL